# Patient Record
Sex: FEMALE | Race: WHITE | NOT HISPANIC OR LATINO | Employment: FULL TIME | URBAN - METROPOLITAN AREA
[De-identification: names, ages, dates, MRNs, and addresses within clinical notes are randomized per-mention and may not be internally consistent; named-entity substitution may affect disease eponyms.]

---

## 2016-07-22 LAB — HBA1C MFR BLD HPLC: 5.9 %

## 2017-09-12 LAB — HBA1C MFR BLD HPLC: 6.3 %

## 2018-01-18 LAB — HBA1C MFR BLD HPLC: 6.3 %

## 2018-08-07 LAB — HBA1C MFR BLD HPLC: 6.6 %

## 2018-08-08 ENCOUNTER — TELEPHONE (OUTPATIENT)
Dept: ENDOCRINOLOGY | Facility: HOSPITAL | Age: 63
End: 2018-08-08

## 2018-08-14 ENCOUNTER — OFFICE VISIT (OUTPATIENT)
Dept: ENDOCRINOLOGY | Facility: HOSPITAL | Age: 63
End: 2018-08-14
Payer: COMMERCIAL

## 2018-08-14 VITALS
DIASTOLIC BLOOD PRESSURE: 90 MMHG | HEART RATE: 82 BPM | HEIGHT: 60 IN | WEIGHT: 154.8 LBS | SYSTOLIC BLOOD PRESSURE: 144 MMHG | BODY MASS INDEX: 30.39 KG/M2

## 2018-08-14 DIAGNOSIS — E89.0 POSTOPERATIVE HYPOTHYROIDISM: Primary | ICD-10-CM

## 2018-08-14 DIAGNOSIS — I10 ESSENTIAL HYPERTENSION: ICD-10-CM

## 2018-08-14 DIAGNOSIS — E11.9 TYPE 2 DIABETES MELLITUS WITHOUT COMPLICATION, WITHOUT LONG-TERM CURRENT USE OF INSULIN (HCC): ICD-10-CM

## 2018-08-14 DIAGNOSIS — E78.2 MIXED HYPERLIPIDEMIA: ICD-10-CM

## 2018-08-14 DIAGNOSIS — E04.1 THYROID NODULE: ICD-10-CM

## 2018-08-14 DIAGNOSIS — Z86.39 HISTORY OF PRIMARY HYPERPARATHYROIDISM: ICD-10-CM

## 2018-08-14 PROBLEM — R73.03 PREDIABETES: Status: ACTIVE | Noted: 2018-08-14

## 2018-08-14 PROCEDURE — 99205 OFFICE O/P NEW HI 60 MIN: CPT | Performed by: INTERNAL MEDICINE

## 2018-08-14 RX ORDER — METFORMIN HYDROCHLORIDE 500 MG/1
TABLET, EXTENDED RELEASE ORAL
Refills: 5 | COMMUNITY
Start: 2018-07-13 | End: 2018-08-14 | Stop reason: SDUPTHER

## 2018-08-14 RX ORDER — METFORMIN HYDROCHLORIDE 500 MG/1
TABLET, EXTENDED RELEASE ORAL
Qty: 60 TABLET | Refills: 11 | Status: SHIPPED | OUTPATIENT
Start: 2018-08-14

## 2018-08-14 RX ORDER — SPIRONOLACTONE 50 MG/1
50 TABLET, FILM COATED ORAL DAILY
Refills: 3 | COMMUNITY
Start: 2018-07-13

## 2018-08-14 RX ORDER — ATORVASTATIN CALCIUM 20 MG/1
20 TABLET, FILM COATED ORAL DAILY
Refills: 4 | COMMUNITY
Start: 2018-07-13

## 2018-08-14 RX ORDER — FENOFIBRATE 145 MG/1
145 TABLET, COATED ORAL DAILY
Refills: 5 | COMMUNITY
Start: 2018-07-13

## 2018-08-14 RX ORDER — METOPROLOL TARTRATE 50 MG/1
50 TABLET, FILM COATED ORAL 2 TIMES DAILY
Refills: 3 | COMMUNITY
Start: 2018-07-13

## 2018-08-14 RX ORDER — LEVOTHYROXINE SODIUM 0.07 MG/1
75 TABLET ORAL DAILY
Refills: 5 | COMMUNITY
Start: 2018-07-13

## 2018-08-14 NOTE — LETTER
August 14, 2018     Viji Mandujano, [address]  [address] [address]    Patient: Michelle Champagne   YOB: 1955   Date of Visit: 8/14/2018       Dear Dr Екатерина Schaffer: Thank you for referring Michelle Champagne to me for evaluation  Below are my notes for this consultation  If you have questions, please do not hesitate to call me  I look forward to following your patient along with you  Sincerely,        Yusuf Aranda DO        CC: No Recipients  Yusuf Aranda DO  8/14/2018  8:09 AM  Sign at close encounter  8/14/2018    Assessment/Plan      Diagnoses and all orders for this visit:    Postoperative hypothyroidism  -     TSH, 3rd generation Lab Collect; Future  -     T4, free Lab Collect; Future  -     TSH, 3rd generation Lab Collect  -     T4, free Lab Collect    History of primary hyperparathyroidism  -     Comprehensive metabolic panel Lab Collect; Future  -     PTH, intact- Lab Collect; Future  -     Comprehensive metabolic panel Lab Collect  -     PTH, intact- Lab Collect    Thyroid nodule  -     US thyroid; Future    Type 2 diabetes mellitus without complication, without long-term current use of insulin (HCC)  -     HEMOGLOBIN A1C W/ EAG ESTIMATION Lab Collect; Future  -     Comprehensive metabolic panel Lab Collect; Future  -     Microalbumin / creatinine urine ratio- Lab Collect; Future  -     metFORMIN (GLUCOPHAGE-XR) 500 mg 24 hr tablet; 2 tab daily   -     HEMOGLOBIN A1C W/ EAG ESTIMATION Lab Collect  -     Comprehensive metabolic panel Lab Collect  -     Microalbumin / creatinine urine ratio- Lab Collect    Mixed hyperlipidemia  -     Lipid Panel with Direct LDL reflex Lab Collect; Future  -     Lipid Panel with Direct LDL reflex Lab Collect    Essential hypertension  -     Comprehensive metabolic panel Lab Collect; Future  -     Comprehensive metabolic panel Lab Collect    Other orders  -     spironolactone (ALDACTONE) 50 mg tablet;  Take 50 mg by mouth daily  -     metoprolol tartrate (LOPRESSOR) 50 mg tablet; Take 50 mg by mouth 2 (two) times a day  -     verapamil (CALAN-SR) 180 mg CR tablet; Take 180 mg by mouth daily  -     levothyroxine 75 mcg tablet; Take 75 mcg by mouth daily  -     Discontinue: metFORMIN (GLUCOPHAGE-XR) 500 mg 24 hr tablet; TAKE ONE TABLET BY MOUTH EVERY DAY WITH SUPPER  -     fenofibrate (TRICOR) 145 mg tablet; Take 145 mg by mouth daily  -     atorvastatin (LIPITOR) 20 mg tablet; Take 20 mg by mouth daily        Assessment/Plan:  1  DM2: New diagnosis based on a1c over 6 5 and fasting sugar 132  Increase metformin XR to 1000 mg daily  Will plan to repeat A1c and CMP in 6 months which will be before her next appointment  I will include a urine microalbumin to creatinine ratios well  She saw her eye doctor in December of 2017  I encouraged her to go again in December of 2018 for a dilated eye exam and evaluation of her retinas  2   Postsurgical hypothyroidism:  Clinically and biochemically euthyroid on levothyroxine 75 mcg daily  3   Thyroid nodule: She will be due for an ultrasound of thyroid nodule in the remaining part of the thyroid prior to her next appointment  4   Hypertension:  Slightly elevated today though the patient states she is slightly nervous  Will continue current regimen and monitor over time  And    5  Hyperlipidemia:  Continue current regimen of fenofibrate and atorvastatin  6   History of primary hyperparathyroidism status post parathyroid adenoma removal:  Her calcium is read as elevated in recent labs at 10 6 though her albumin is 4 7  Her calcium probably corrects to the high end of the normal range  Her PTH level is 24 which is reassuring  We will plan to follow with this with a CMP and PTH before next appointment        CC: DM, hypothyroidism    History of Present Illness     HPI: Malgorzata West is a 66y o  year old female with history of hypothyroidism status post subtotal thyroidectomy for thyroid nodules  Pathology was listed as no evidence of hyperplasia atypia or malignancy in the thyroid nodules  At the same time she also had a hypercellular parathyroid removed for primary hyperparathyroidism  She has been maintained on levothyroxine 75 mcg daily  Overall she feels okay  She has a thyroid nodule in the remaining part of her thyroid lobe which is followed with serial ultrasounds  In the past she was followed for prediabetes  Was recent blood work suggest type 2 diabetes  She denies polyuria, polydipsia  She denies any painful neuropathy use  She is on metformin  mg daily  She does report a family history in her sister of diabetes  She also has a history of hypertension in takes verapamil, spironolactone, metoprolol  For her hypertriglyceridemia and hyperlipidemia she takes atorvastatin 20 mg daily and fenofibrate 145 mg daily  Review of Systems   Constitutional: Negative for fatigue  HENT: Negative for trouble swallowing and voice change  Eyes: Negative for visual disturbance  Respiratory: Negative for shortness of breath  Cardiovascular: Negative for palpitations and leg swelling  Gastrointestinal: Negative for abdominal pain, nausea and vomiting  Endocrine: Negative for polydipsia and polyuria  Musculoskeletal: Negative for arthralgias and myalgias  Skin: Negative for rash  Neurological: Negative for dizziness, tremors and weakness  Hematological: Negative for adenopathy  Psychiatric/Behavioral: Negative for agitation and confusion  Historical Information   No past medical history on file  No past surgical history on file    Social History   History   Alcohol use Not on file     History   Drug use: Unknown     History   Smoking Status    Never Smoker   Smokeless Tobacco    Never Used     Family History:   Family History   Problem Relation Age of Onset    No Known Problems Mother     Hypertension Father     Diabetes type II Sister Meds/Allergies   Current Outpatient Prescriptions   Medication Sig Dispense Refill    atorvastatin (LIPITOR) 20 mg tablet Take 20 mg by mouth daily  4    fenofibrate (TRICOR) 145 mg tablet Take 145 mg by mouth daily  5    levothyroxine 75 mcg tablet Take 75 mcg by mouth daily  5    metFORMIN (GLUCOPHAGE-XR) 500 mg 24 hr tablet 2 tab daily  60 tablet 11    metoprolol tartrate (LOPRESSOR) 50 mg tablet Take 50 mg by mouth 2 (two) times a day  3    spironolactone (ALDACTONE) 50 mg tablet Take 50 mg by mouth daily  3    verapamil (CALAN-SR) 180 mg CR tablet Take 180 mg by mouth daily  3     No current facility-administered medications for this visit  Allergies   Allergen Reactions    Atenolol Swelling    Hydrochlorothiazide Other (See Comments)     Pt thinks she got Gout   Lisinopril Other (See Comments)     Pt does not remember the reaction  Objective   Vitals: Blood pressure 144/90, pulse 82, height 5' (1 524 m), weight 70 2 kg (154 lb 12 8 oz)  Invasive Devices          No matching active lines, drains, or airways          Physical Exam   Constitutional: She is oriented to person, place, and time  She appears well-developed and well-nourished  No distress  HENT:   Head: Normocephalic and atraumatic  Eyes: Conjunctivae are normal  Pupils are equal, round, and reactive to light  Neck: Normal range of motion  Neck supple  No thyromegaly present  Cardiovascular: Normal rate and regular rhythm  No murmur heard  Pulmonary/Chest: Effort normal and breath sounds normal  No respiratory distress  Abdominal: Soft  Bowel sounds are normal  She exhibits no distension  Musculoskeletal: Normal range of motion  She exhibits no edema  Neurological: She is alert and oriented to person, place, and time  She exhibits normal muscle tone  Skin: Skin is warm and dry  No rash noted  She is not diaphoretic  Psychiatric: She has a normal mood and affect   Her behavior is normal    Vitals reviewed  The history was obtained from the review of the chart and from the patient  Lab Results:      Labs from [facility] on 08/07/2018: White blood cells 10, hemoglobin 14 3, hematocrit 43 4, platelets 265, glucose 132, BUN 23, creatinine 0 95, GFR 65, calcium 10 6, albumin 4 7, liver function within normal limits, total cholesterol 169, triglycerides 184, HDL 47, LDL 85, TSH 1 17, free thyroxine index 2 2, PTH 24, A1c 6 6, B12 584  No future appointments

## 2018-08-14 NOTE — PROGRESS NOTES
8/14/2018    Assessment/Plan      Diagnoses and all orders for this visit:    Postoperative hypothyroidism  -     TSH, 3rd generation Lab Collect; Future  -     T4, free Lab Collect; Future  -     TSH, 3rd generation Lab Collect  -     T4, free Lab Collect    History of primary hyperparathyroidism  -     Comprehensive metabolic panel Lab Collect; Future  -     PTH, intact- Lab Collect; Future  -     Comprehensive metabolic panel Lab Collect  -     PTH, intact- Lab Collect    Thyroid nodule  -     US thyroid; Future    Type 2 diabetes mellitus without complication, without long-term current use of insulin (HCC)  -     HEMOGLOBIN A1C W/ EAG ESTIMATION Lab Collect; Future  -     Comprehensive metabolic panel Lab Collect; Future  -     Microalbumin / creatinine urine ratio- Lab Collect; Future  -     metFORMIN (GLUCOPHAGE-XR) 500 mg 24 hr tablet; 2 tab daily   -     HEMOGLOBIN A1C W/ EAG ESTIMATION Lab Collect  -     Comprehensive metabolic panel Lab Collect  -     Microalbumin / creatinine urine ratio- Lab Collect    Mixed hyperlipidemia  -     Lipid Panel with Direct LDL reflex Lab Collect; Future  -     Lipid Panel with Direct LDL reflex Lab Collect    Essential hypertension  -     Comprehensive metabolic panel Lab Collect; Future  -     Comprehensive metabolic panel Lab Collect    Other orders  -     spironolactone (ALDACTONE) 50 mg tablet; Take 50 mg by mouth daily  -     metoprolol tartrate (LOPRESSOR) 50 mg tablet; Take 50 mg by mouth 2 (two) times a day  -     verapamil (CALAN-SR) 180 mg CR tablet; Take 180 mg by mouth daily  -     levothyroxine 75 mcg tablet; Take 75 mcg by mouth daily  -     Discontinue: metFORMIN (GLUCOPHAGE-XR) 500 mg 24 hr tablet; TAKE ONE TABLET BY MOUTH EVERY DAY WITH SUPPER  -     fenofibrate (TRICOR) 145 mg tablet; Take 145 mg by mouth daily  -     atorvastatin (LIPITOR) 20 mg tablet; Take 20 mg by mouth daily        Assessment/Plan:  1   DM2: New diagnosis based on a1c over 6 5 and fasting sugar 132  Increase metformin XR to 1000 mg daily  Will plan to repeat A1c and CMP in 6 months which will be before her next appointment  I will include a urine microalbumin to creatinine ratios well  She saw her eye doctor in December of 2017  I encouraged her to go again in December of 2018 for a dilated eye exam and evaluation of her retinas  2   Postsurgical hypothyroidism:  Clinically and biochemically euthyroid on levothyroxine 75 mcg daily  3   Thyroid nodule: She will be due for an ultrasound of thyroid nodule in the remaining part of the thyroid prior to her next appointment  4   Hypertension:  Slightly elevated today though the patient states she is slightly nervous  Will continue current regimen and monitor over time  And    5  Hyperlipidemia:  Continue current regimen of fenofibrate and atorvastatin  6   History of primary hyperparathyroidism status post parathyroid adenoma removal:  Her calcium is read as elevated in recent labs at 10 6 though her albumin is 4 7  Her calcium probably corrects to the high end of the normal range  Her PTH level is 24 which is reassuring  We will plan to follow with this with a CMP and PTH before next appointment  CC: DM, hypothyroidism    History of Present Illness     HPI: Alejandrina Espino is a 66y o  year old female with history of hypothyroidism status post subtotal thyroidectomy for thyroid nodules  Pathology was listed as no evidence of hyperplasia atypia or malignancy in the thyroid nodules  At the same time she also had a hypercellular parathyroid removed for primary hyperparathyroidism  She has been maintained on levothyroxine 75 mcg daily  Overall she feels okay  She has a thyroid nodule in the remaining part of her thyroid lobe which is followed with serial ultrasounds  In the past she was followed for prediabetes  Was recent blood work suggest type 2 diabetes  She denies polyuria, polydipsia    She denies any painful neuropathy use  She is on metformin  mg daily  She does report a family history in her sister of diabetes  She also has a history of hypertension in takes verapamil, spironolactone, metoprolol  For her hypertriglyceridemia and hyperlipidemia she takes atorvastatin 20 mg daily and fenofibrate 145 mg daily  Review of Systems   Constitutional: Negative for fatigue  HENT: Negative for trouble swallowing and voice change  Eyes: Negative for visual disturbance  Respiratory: Negative for shortness of breath  Cardiovascular: Negative for palpitations and leg swelling  Gastrointestinal: Negative for abdominal pain, nausea and vomiting  Endocrine: Negative for polydipsia and polyuria  Musculoskeletal: Negative for arthralgias and myalgias  Skin: Negative for rash  Neurological: Negative for dizziness, tremors and weakness  Hematological: Negative for adenopathy  Psychiatric/Behavioral: Negative for agitation and confusion  Historical Information   No past medical history on file  No past surgical history on file  Social History   History   Alcohol use Not on file     History   Drug use: Unknown     History   Smoking Status    Never Smoker   Smokeless Tobacco    Never Used     Family History:   Family History   Problem Relation Age of Onset    No Known Problems Mother     Hypertension Father     Diabetes type II Sister        Meds/Allergies   Current Outpatient Prescriptions   Medication Sig Dispense Refill    atorvastatin (LIPITOR) 20 mg tablet Take 20 mg by mouth daily  4    fenofibrate (TRICOR) 145 mg tablet Take 145 mg by mouth daily  5    levothyroxine 75 mcg tablet Take 75 mcg by mouth daily  5    metFORMIN (GLUCOPHAGE-XR) 500 mg 24 hr tablet 2 tab daily   60 tablet 11    metoprolol tartrate (LOPRESSOR) 50 mg tablet Take 50 mg by mouth 2 (two) times a day  3    spironolactone (ALDACTONE) 50 mg tablet Take 50 mg by mouth daily  3    verapamil (CALAN-SR) 180 mg CR tablet Take 180 mg by mouth daily  3     No current facility-administered medications for this visit  Allergies   Allergen Reactions    Atenolol Swelling    Hydrochlorothiazide Other (See Comments)     Pt thinks she got Gout   Lisinopril Other (See Comments)     Pt does not remember the reaction  Objective   Vitals: Blood pressure 144/90, pulse 82, height 5' (1 524 m), weight 70 2 kg (154 lb 12 8 oz)  Invasive Devices          No matching active lines, drains, or airways          Physical Exam   Constitutional: She is oriented to person, place, and time  She appears well-developed and well-nourished  No distress  HENT:   Head: Normocephalic and atraumatic  Eyes: Conjunctivae are normal  Pupils are equal, round, and reactive to light  Neck: Normal range of motion  Neck supple  No thyromegaly present  Cardiovascular: Normal rate and regular rhythm  No murmur heard  Pulmonary/Chest: Effort normal and breath sounds normal  No respiratory distress  Abdominal: Soft  Bowel sounds are normal  She exhibits no distension  Musculoskeletal: Normal range of motion  She exhibits no edema  Neurological: She is alert and oriented to person, place, and time  She exhibits normal muscle tone  Skin: Skin is warm and dry  No rash noted  She is not diaphoretic  Psychiatric: She has a normal mood and affect  Her behavior is normal    Vitals reviewed  The history was obtained from the review of the chart and from the patient  Lab Results:      Labs from [facility] on 08/07/2018: White blood cells 10, hemoglobin 14 3, hematocrit 43 4, platelets 088, glucose 132, BUN 23, creatinine 0 95, GFR 65, calcium 10 6, albumin 4 7, liver function within normal limits, total cholesterol 169, triglycerides 184, HDL 47, LDL 85, TSH 1 17, free thyroxine index 2 2, PTH 24, A1c 6 6, B12 584  No future appointments

## 2020-10-19 ENCOUNTER — TELEPHONE (OUTPATIENT)
Dept: GASTROENTEROLOGY | Facility: CLINIC | Age: 65
End: 2020-10-19

## 2020-11-27 ENCOUNTER — TELEMEDICINE (OUTPATIENT)
Dept: GASTROENTEROLOGY | Facility: CLINIC | Age: 65
End: 2020-11-27

## 2020-11-27 VITALS — BODY MASS INDEX: 26.5 KG/M2 | HEIGHT: 60 IN | WEIGHT: 135 LBS

## 2020-11-27 DIAGNOSIS — Z12.11 SCREENING FOR COLON CANCER: Primary | ICD-10-CM

## 2020-11-27 RX ORDER — SODIUM, POTASSIUM,MAG SULFATES 17.5-3.13G
SOLUTION, RECONSTITUTED, ORAL ORAL
Qty: 2 BOTTLE | Refills: 0 | Status: SHIPPED | OUTPATIENT
Start: 2020-11-27

## 2020-12-04 ENCOUNTER — HOSPITAL ENCOUNTER (OUTPATIENT)
Dept: GASTROENTEROLOGY | Facility: AMBULATORY SURGERY CENTER | Age: 65
Discharge: HOME | End: 2020-12-04
Payer: COMMERCIAL

## 2020-12-04 ENCOUNTER — ANESTHESIA (OUTPATIENT)
Dept: GASTROENTEROLOGY | Facility: AMBULATORY SURGERY CENTER | Age: 65
End: 2020-12-04

## 2020-12-04 ENCOUNTER — ANESTHESIA EVENT (OUTPATIENT)
Dept: GASTROENTEROLOGY | Facility: AMBULATORY SURGERY CENTER | Age: 65
End: 2020-12-04

## 2020-12-04 VITALS
TEMPERATURE: 99.4 F | RESPIRATION RATE: 26 BRPM | DIASTOLIC BLOOD PRESSURE: 77 MMHG | SYSTOLIC BLOOD PRESSURE: 126 MMHG | OXYGEN SATURATION: 98 % | HEART RATE: 73 BPM

## 2020-12-04 VITALS — HEART RATE: 72 BPM

## 2020-12-04 DIAGNOSIS — Z86.010 HISTORY OF COLON POLYPS: ICD-10-CM

## 2020-12-04 PROCEDURE — 45380 COLONOSCOPY AND BIOPSY: CPT | Performed by: INTERNAL MEDICINE

## 2020-12-04 RX ORDER — SODIUM CHLORIDE 9 MG/ML
50 INJECTION, SOLUTION INTRAVENOUS CONTINUOUS
Status: DISCONTINUED | OUTPATIENT
Start: 2020-12-04 | End: 2020-12-08 | Stop reason: HOSPADM

## 2020-12-04 RX ORDER — PROPOFOL 10 MG/ML
INJECTION, EMULSION INTRAVENOUS AS NEEDED
Status: DISCONTINUED | OUTPATIENT
Start: 2020-12-04 | End: 2020-12-04

## 2020-12-04 RX ADMIN — SODIUM CHLORIDE 50 ML/HR: 9 INJECTION, SOLUTION INTRAVENOUS at 08:56

## 2020-12-04 RX ADMIN — PROPOFOL 80 MG: 10 INJECTION, EMULSION INTRAVENOUS at 09:18

## 2020-12-04 RX ADMIN — PROPOFOL 40 MG: 10 INJECTION, EMULSION INTRAVENOUS at 09:27

## 2020-12-04 RX ADMIN — PROPOFOL 40 MG: 10 INJECTION, EMULSION INTRAVENOUS at 09:22

## 2020-12-04 RX ADMIN — PROPOFOL 40 MG: 10 INJECTION, EMULSION INTRAVENOUS at 09:25

## 2023-02-16 LAB
CREAT ?TM UR-SCNC: 114.5 UMOL/L
EXT ALBUMIN URINE RANDOM: 55.3
HBA1C MFR BLD HPLC: 8 %
MICROALBUMIN/CREAT UR: 48 MG/G{CREAT}

## 2023-08-22 LAB — HBA1C MFR BLD HPLC: 7.6 %

## 2023-12-01 ENCOUNTER — OFFICE VISIT (OUTPATIENT)
Dept: ENDOCRINOLOGY | Facility: HOSPITAL | Age: 68
End: 2023-12-01
Payer: MEDICARE

## 2023-12-01 VITALS
WEIGHT: 135 LBS | HEIGHT: 60 IN | HEART RATE: 82 BPM | OXYGEN SATURATION: 97 % | SYSTOLIC BLOOD PRESSURE: 130 MMHG | BODY MASS INDEX: 26.5 KG/M2 | DIASTOLIC BLOOD PRESSURE: 78 MMHG

## 2023-12-01 DIAGNOSIS — I10 ESSENTIAL HYPERTENSION: ICD-10-CM

## 2023-12-01 DIAGNOSIS — E78.2 MIXED HYPERLIPIDEMIA: ICD-10-CM

## 2023-12-01 DIAGNOSIS — E89.0 POSTOPERATIVE HYPOTHYROIDISM: ICD-10-CM

## 2023-12-01 DIAGNOSIS — Z86.39 HISTORY OF PRIMARY HYPERPARATHYROIDISM: ICD-10-CM

## 2023-12-01 DIAGNOSIS — E11.9 TYPE 2 DIABETES MELLITUS WITHOUT COMPLICATION, WITHOUT LONG-TERM CURRENT USE OF INSULIN (HCC): Primary | ICD-10-CM

## 2023-12-01 PROBLEM — R73.03 PREDIABETES: Status: RESOLVED | Noted: 2018-08-14 | Resolved: 2023-12-01

## 2023-12-01 PROCEDURE — 99204 OFFICE O/P NEW MOD 45 MIN: CPT | Performed by: INTERNAL MEDICINE

## 2023-12-01 RX ORDER — ALENDRONATE SODIUM 70 MG/1
70 TABLET ORAL
COMMUNITY
Start: 2023-11-28

## 2023-12-01 RX ORDER — METHOTREXATE 2.5 MG/1
TABLET ORAL
COMMUNITY
Start: 2023-11-15

## 2023-12-01 RX ORDER — FOLIC ACID 1 MG/1
1000 TABLET ORAL DAILY
COMMUNITY
Start: 2023-11-13

## 2023-12-01 RX ORDER — GLIPIZIDE 10 MG/1
10 TABLET, FILM COATED, EXTENDED RELEASE ORAL DAILY
COMMUNITY
Start: 2023-09-23

## 2023-12-01 RX ORDER — PREDNISONE 5 MG/1
2.5 TABLET ORAL DAILY
COMMUNITY
Start: 2023-11-24

## 2023-12-01 NOTE — ASSESSMENT & PLAN NOTE
The last TSH is normal signifying biochemical euthyroidism. She will continue the same levothyroxine 75 mcg daily.

## 2023-12-01 NOTE — PATIENT INSTRUCTIONS
The last hgba1c is 7.6%. this is a bit high and was before you started the prednisone. The prednisone will raise blood sugars. I do not want to do blood work now as the prednisone will increase blood sugars. Since you are going to go off prednisone in 2 weeks or so. Let's have you change the blood sugars  testing to alternate am and before supper starting about 2 weeks after off prednisone and call them to me. For now, no change in jardiance, glipizide, and metformin. Continue tow work on diabetic diet. The thyroid is ok, continue the same levothyroxine. Work on getting a eye doctor visit when able. Follow up in 4 months with blood work.

## 2023-12-01 NOTE — PROGRESS NOTES
12/1/2023    Assessment/Plan     1. Type 2 diabetes mellitus without complication, without long-term current use of insulin ([facility])  Assessment & Plan:  Most recent hemoglobin A1c is not bad at 7.6 percent and has improved over the last year and a half. Unfortunately, she has now been on prednisone since then. I expect that her blood glucose levels are higher as she has been on prednisone. For now, she will be going off prednisone in the next couple of weeks, so I will not adjust her Jardiance, glipizide, and metformin. Once she goes off prednisone, I have asked her to give it 2 weeks and make sure to start testing her blood sugars in the morning and then the next day before supper and then again in the morning the next day and just alternate the times and send those to the office after several weeks. I have asked her to follow up with ophthalmology for diabetic eye exam as she is overdue for that and spot check in the office is not adequate for a diabetic to evaluate for retinopathy in the periphery of the retina. She is following with podiatry. Orders:  -     Comprehensive metabolic panel Lab Collect; Future; Expected date: 02/26/2024  -     CBC and differential Lab Collect; Future; Expected date: 02/26/2024  -     T4, free Lab Collect; Future; Expected date: 02/26/2024  -     TSH, 3rd generation with Free T4 reflex; Future; Expected date: 02/26/2024  -     HEMOGLOBIN A1C W/ EAG ESTIMATION Lab Collect; Future; Expected date: 02/26/2024  -     Phosphorus Lab Collect; Future; Expected date: 02/26/2024    2. Postoperative hypothyroidism  Assessment & Plan:  The last TSH is normal signifying biochemical euthyroidism. She will continue the same levothyroxine 75 mcg daily. Orders:  -     Comprehensive metabolic panel Lab Collect; Future; Expected date: 02/26/2024  -     CBC and differential Lab Collect; Future; Expected date: 02/26/2024  -     T4, free Lab Collect;  Future; Expected date: 02/26/2024  -     TSH, 3rd generation with Free T4 reflex; Future; Expected date: 02/26/2024  -     HEMOGLOBIN A1C W/ EAG ESTIMATION Lab Collect; Future; Expected date: 02/26/2024  -     Phosphorus Lab Collect; Future; Expected date: 02/26/2024    3. Essential hypertension  Assessment & Plan:  She is normotensive in the office on her current dose of metoprolol, verapamil, and spironolactone. Orders:  -     Comprehensive metabolic panel Lab Collect; Future; Expected date: 02/26/2024  -     CBC and differential Lab Collect; Future; Expected date: 02/26/2024  -     T4, free Lab Collect; Future; Expected date: 02/26/2024  -     TSH, 3rd generation with Free T4 reflex; Future; Expected date: 02/26/2024  -     HEMOGLOBIN A1C W/ EAG ESTIMATION Lab Collect; Future; Expected date: 02/26/2024  -     Phosphorus Lab Collect; Future; Expected date: 02/26/2024    4. Mixed hyperlipidemia  Assessment & Plan:  The last lipid profile was excellent on her current dose of atorvastatin and fenofibrate. Orders:  -     Comprehensive metabolic panel Lab Collect; Future; Expected date: 02/26/2024  -     CBC and differential Lab Collect; Future; Expected date: 02/26/2024  -     T4, free Lab Collect; Future; Expected date: 02/26/2024  -     TSH, 3rd generation with Free T4 reflex; Future; Expected date: 02/26/2024  -     HEMOGLOBIN A1C W/ EAG ESTIMATION Lab Collect; Future; Expected date: 02/26/2024  -     Phosphorus Lab Collect; Future; Expected date: 02/26/2024    5. History of primary hyperparathyroidism  Assessment & Plan:  She had left parathyroid adenoma removed and most recent calcium levels have fluctuated up and down, but the parathyroid hormone level has also decreased when calcium is higher, which is appropriate response. For now, this will be followed over time. Orders:  -     Comprehensive metabolic panel Lab Collect; Future; Expected date: 02/26/2024  -     CBC and differential Lab Collect;  Future; Expected date: 02/26/2024  -     T4, free Lab Collect; Future; Expected date: 02/26/2024  -     TSH, 3rd generation with Free T4 reflex; Future; Expected date: 02/26/2024  -     HEMOGLOBIN A1C W/ EAG ESTIMATION Lab Collect; Future; Expected date: 02/26/2024  -     Phosphorus Lab Collect; Future; Expected date: 02/26/2024       I have asked her to follow up in 3 to 4 months with preceding hemoglobin A1c, CMP, CBC, TSH, and free T4.    CC: Diabetes Consult    History of Present Illness     HPI: Porter Solo is a 79y.o. year old female who is a new patient consult for diabetes and thyroid. Her rheumatologist recommended her to see endocrinology for help with diabetes and thyroid. She was diagnosed with primary hyperparathyroidism in 2011. She had 1 thyroid nodule which was biopsied and did not have enough material for evaluation. She had subtotal thyroidectomy and left lower parathyroidectomy in 2011 by Dr. Torsten Jorge. Based on the pathological findings in 04/20211, cancer was not detected. For her hypothyroidism, she is on levothyroxine 75 mcg 1 pill a day. She takes it first thing in the morning on an empty stomach and waits at least 0.5 to an hour before eating. She denies temperature intolerance, heart racing, palpitations, tremors, or shaky hands, diarrhea, constipation, abdominal pain, or feeling sick of the stomach, hair loss, fatigue during the day, dysphagia and any radiation treatment to her head or neck. She has dry skin. Her nails break a lot. She sleeps well at night. She wakes up 2 times to go to the bathroom. Her weight has been fluctuating. She was diagnosed by Dr. Rodo Ruano with diabetes in 2018. She was already taking a standard metformin 1000 mg once a day in the evening. She was also on glipizide once a day for 1.5 years. She was also currently taking Jardiance for approximately 2 years. Her metformin was changed last year from 500 to 1000 mg daily. She has seen a dietitian online.  She experiences frequent urination and has mentioned waking up twice at night to urinate. She admits to polydipsia but not polyphagia. She denies numbness or tingling and wound of her feet. She sees a podiatrist for plantar fasciitis. Her podiatrist observed that some of her toenails are not in optimal condition. She had her most recent appointment with the podiatrist for her toenails about 6 to 7 weeks ago. She typically visits her podiatrist every 6 weeks but has extended the intervals due to insurance coverage limitations. She denies blurry vision other than when she is not wearing her eyeglasses and any diabetic changes in her eye. Her last eye doctor visit was 2 years ago. She attempted to contact her eye doctor, but there has been no response, and she is uncertain about the status of her doctor, whether retired or still practicing. She denies kidney problems from diabetes, neuropathy or nerve problems, heart attacks or strokes and circulation issues in her legs. She checks her blood glucose once a day in the morning. Her blood glucose today was 130. Earlier this week, her blood glucose was 115 to 116. She had some low blood glucose levels in the summer around 09/2023 or early fall as low as 70 to 71. She felt her head was cloudy. She drank juice and ate some crackers. She denies chest pain or dyspnea, headaches, lightheadedness, dizziness, or weakness on one side or the other or stroke like symptoms. She is on metoprolol 50 mg twice a day, spironolactone 50 mg once a day, and verapamil 180 mg a day. She has rheumatoid arthritis. She started prednisone 09/20/2023 and is currently on 2.5 mg. Her rheumatologist wanted to wean her off prednisone in 2 weeks and reduced the dosage earlier this week. She is taking alendronate once a week. She is on folic acid every day together with methotrexate.  She used to take calcium, but her rheumatologist advised her to discontinue it, possibly for a period of 5 to 6 weeks and started her medication for her bone. She is on cholesterol medicines which are fenofibrate 145 mg a day and atorvastatin 40 mg a day. Last A1C was   Lab Results   Component Value Date    HGBA1C 6.6 08/07/2018   . Review of Systems  The pertinent positive and negative findings are as noted in the HPI.     Historical Information   Past Medical History:   Diagnosis Date    Diabetes mellitus ([address])     Disease of thyroid gland     hypo    Hyperlipidemia     Hyperparathyroidism ([address])     Hypertension      Past Surgical History:   Procedure Laterality Date    PARATHYROID GLAND SURGERY  2011    left lower parathyroid removed    THYROIDECTOMY  2011    subtotal thyroidectomy     Social History   Social History     Substance and Sexual Activity   Alcohol Use Never     Social History     Substance and Sexual Activity   Drug Use Never     Social History     Tobacco Use   Smoking Status Never   Smokeless Tobacco Never     Family History:   Family History   Problem Relation Age of Onset    Thyroid disease unspecified Mother     Hypertension Father     Diabetes type II Sister     Colon polyps Neg Hx     Colon cancer Neg Hx        Meds/Allergies   Current Outpatient Medications   Medication Sig Dispense Refill    alendronate (FOSAMAX) 70 mg tablet Take 70 mg by mouth every 7 days      atorvastatin (LIPITOR) 40 mg tablet Take 40 mg by mouth daily  4    Empagliflozin 25 MG TABS Take by mouth 25 mg daily      fenofibrate (TRICOR) 145 mg tablet Take 145 mg by mouth daily  5    folic acid (FOLVITE) 1 mg tablet Take 1,000 mcg by mouth daily      glipiZIDE (GLUCOTROL XL) 10 mg 24 hr tablet Take 10 mg by mouth daily      levothyroxine 75 mcg tablet Take 75 mcg by mouth daily  5    METFORMIN HCL PO Take by mouth 1000mg daily      methotrexate 2.5 MG tablet TAKE 6 TABLETS BY MOUTH ONCE PER WEEK      metoprolol tartrate (LOPRESSOR) 50 mg tablet Take 50 mg by mouth 2 (two) times a day  3    predniSONE 5 mg tablet Take 2.5 mg by mouth daily spironolactone (ALDACTONE) 50 mg tablet Take 50 mg by mouth daily  3    verapamil (CALAN-SR) 180 mg CR tablet Take 180 mg by mouth daily  3    Na Sulfate-K Sulfate-Mg Sulf (Suprep Bowel Prep Kit) 17.5-3.13-1.6 GM/177ML SOLN Use as directed (Patient not taking: Reported on 12/1/2023) 2 Bottle 0     No current facility-administered medications for this visit. Allergies   Allergen Reactions    Atenolol Swelling    Hydrochlorothiazide Other (See Comments)     Pt thinks she got Gout. Lisinopril Other (See Comments)     Pt does not remember the reaction. Objective   Vitals: Blood pressure 130/78, pulse 82, height 5' (1.524 m), weight 61.2 kg (135 lb), SpO2 97 %. Invasive Devices       None                   Physical Exam  Cardiovascular:      Pulses: no weak pulses          Dorsalis pedis pulses are 2+ on the right side and 2+ on the left side. Posterior tibial pulses are 2+ on the right side and 2+ on the left side. Feet:      Right foot:      Skin integrity: No ulcer, skin breakdown, erythema, warmth, callus or dry skin. Left foot:      Skin integrity: No ulcer, skin breakdown, erythema, warmth, callus or dry skin. Physical exam normal with pertinent positives and negatives. Eyes: No lid lag, stare, proptosis, or periorbital edema. Negative Chvostek sign. Neck exam: Demonstrates a healed anterior neck scar. No palpable thyroid tissue. No lymphadenopathy. No masses of the neck. Respiratory: Lungs clear. Cardiovascular: Heart regular. No murmurs. Back examination: No spinous process tenderness or CVA tenderness. Neurological: No tremor of the outstretched hands. Patellar deep tendon reflexes normal.   Foot exam: No lower extremity edema. No ulcerations in the feet. Bunions of the first metatarsophalangeal joint bilaterally. Dorsalis pedis and posterior tibialis pulses 2+. Dry skin of the feet. Vibration sensation is slightly diminished to the first toe DIP joints bilaterally. Microfilament sensation intact to both feet. Patient's shoes and socks removed. Right Foot/Ankle   Right Foot Inspection  Skin Exam: skin normal and skin intact. No dry skin, no warmth, no callus, no erythema, no maceration, no abnormal color, no pre-ulcer, no ulcer and no callus. Toe Exam: right toe deformity. No swelling    Sensory   Vibration: diminished  Monofilament testing: intact    Vascular  Capillary refills: < 3 seconds  The right DP pulse is 2+. The right PT pulse is 2+. Left Foot/Ankle  Left Foot Inspection  Skin Exam: skin normal and skin intact. No dry skin, no warmth, no erythema, no maceration, normal color, no pre-ulcer, no ulcer and no callus. Toe Exam: left toe deformity. No swelling. Sensory   Vibration: diminished  Monofilament testing: intact    Vascular  Capillary refills: < 3 seconds  The left DP pulse is 2+. The left PT pulse is 2+. Assign Risk Category  Deformity present  Loss of protective sensation  No weak pulses  Risk: 2        The history was obtained from the review of the chart and from the patient. Lab Results:    Most recent Alc is  Lab Results   Component Value Date    HGBA1C 6.6 08/07/2018             Blood work performed at [facility] on 08/22/2023 showed a hemoglobin A1c of 7.6 percent. Calcium was 10.5 with a parathyroid hormone level of 16 and cholesterol was quite good. Previous hemoglobin A1c in 04/2022 was 8.5 percent, which has improved significantly. More recent calcium was 10 with a parathyroid hormone level of 23. TSH in 08/2023 was 1.65. Future Appointments   Date Time Provider [address]   3/19/2024  1:20 PM Rico Rousseau MD [facility]       Transcribed for Rico Rousseau MD, by Eryn Tao on 12/01/23 at 3:27 PM. Powered by [employer].

## 2023-12-01 NOTE — ASSESSMENT & PLAN NOTE
Most recent hemoglobin A1c is not bad at 7.6 percent and has improved over the last year and a half. Unfortunately, she has now been on prednisone since then. I expect that her blood glucose levels are higher as she has been on prednisone. For now, she will be going off prednisone in the next couple of weeks, so I will not adjust her Jardiance, glipizide, and metformin. Once she goes off prednisone, I have asked her to give it 2 weeks and make sure to start testing her blood sugars in the morning and then the next day before supper and then again in the morning the next day and just alternate the times and send those to the office after several weeks. I have asked her to follow up with ophthalmology for diabetic eye exam as she is overdue for that and spot check in the office is not adequate for a diabetic to evaluate for retinopathy in the periphery of the retina. She is following with podiatry.

## 2023-12-21 LAB
LEFT EYE DIABETIC RETINOPATHY: NORMAL
RIGHT EYE DIABETIC RETINOPATHY: NORMAL
SEVERITY (EYE EXAM): NORMAL

## 2024-02-21 LAB — HBA1C MFR BLD HPLC: 9.4 %

## 2024-03-15 LAB
ALBUMIN SERPL-MCNC: 4.6 G/DL (ref 3.9–4.9)
ALBUMIN/GLOB SERPL: 2.1 {RATIO} (ref 1.2–2.2)
ALP SERPL-CCNC: 59 IU/L (ref 44–121)
ALT SERPL-CCNC: 20 IU/L (ref 0–32)
AST SERPL-CCNC: 19 IU/L (ref 0–40)
BASOPHILS # BLD AUTO: 0.1 X10E3/UL (ref 0–0.2)
BASOPHILS NFR BLD AUTO: 1 %
BILIRUB SERPL-MCNC: 0.5 MG/DL (ref 0–1.2)
BUN SERPL-MCNC: 25 MG/DL (ref 8–27)
BUN/CREAT SERPL: 29 (ref 12–28)
CALCIUM SERPL-MCNC: 10.3 MG/DL (ref 8.7–10.3)
CHLORIDE SERPL-SCNC: 100 MMOL/L (ref 96–106)
CO2 SERPL-SCNC: 22 MMOL/L (ref 20–29)
CREAT SERPL-MCNC: 0.86 MG/DL (ref 0.57–1)
EGFR: 74 ML/MIN/1.73
EOSINOPHIL # BLD AUTO: 0.4 X10E3/UL (ref 0–0.4)
EOSINOPHIL NFR BLD AUTO: 4 %
ERYTHROCYTE [DISTWIDTH] IN BLOOD BY AUTOMATED COUNT: 15.3 % (ref 11.7–15.4)
EST. AVERAGE GLUCOSE BLD GHB EST-MCNC: 217 MG/DL
GLOBULIN SER-MCNC: 2.2 G/DL (ref 1.5–4.5)
GLUCOSE SERPL-MCNC: 173 MG/DL (ref 70–99)
HBA1C MFR BLD: 9.2 % (ref 4.8–5.6)
HCT VFR BLD AUTO: 44.5 % (ref 34–46.6)
HGB BLD-MCNC: 14.3 G/DL (ref 11.1–15.9)
IMM GRANULOCYTES # BLD: 0 X10E3/UL (ref 0–0.1)
IMM GRANULOCYTES NFR BLD: 0 %
LYMPHOCYTES # BLD AUTO: 2 X10E3/UL (ref 0.7–3.1)
LYMPHOCYTES NFR BLD AUTO: 22 %
MCH RBC QN AUTO: 27.7 PG (ref 26.6–33)
MCHC RBC AUTO-ENTMCNC: 32.1 G/DL (ref 31.5–35.7)
MCV RBC AUTO: 86 FL (ref 79–97)
MONOCYTES # BLD AUTO: 0.3 X10E3/UL (ref 0.1–0.9)
MONOCYTES NFR BLD AUTO: 3 %
NEUTROPHILS # BLD AUTO: 6.5 X10E3/UL (ref 1.4–7)
NEUTROPHILS NFR BLD AUTO: 70 %
PHOSPHATE SERPL-MCNC: 3.7 MG/DL (ref 3–4.3)
PLATELET # BLD AUTO: 400 X10E3/UL (ref 150–450)
POTASSIUM SERPL-SCNC: 4.1 MMOL/L (ref 3.5–5.2)
PROT SERPL-MCNC: 6.8 G/DL (ref 6–8.5)
RBC # BLD AUTO: 5.16 X10E6/UL (ref 3.77–5.28)
SODIUM SERPL-SCNC: 140 MMOL/L (ref 134–144)
TSH SERPL DL<=0.005 MIU/L-ACNC: 1.83 UIU/ML (ref 0.45–4.5)
WBC # BLD AUTO: 9.4 X10E3/UL (ref 3.4–10.8)

## 2024-03-18 ENCOUNTER — TELEPHONE (OUTPATIENT)
Age: 69
End: 2024-03-18

## 2024-03-18 NOTE — TELEPHONE ENCOUNTER
Pt has appt kole and just wanted to make she she didn't forget to ask this question.      Pt is asking if her A1C has gone up.  She is asking if one of her other meds could be inter-acting to make the A1c go up.

## 2024-03-19 ENCOUNTER — OFFICE VISIT (OUTPATIENT)
Dept: ENDOCRINOLOGY | Facility: HOSPITAL | Age: 69
End: 2024-03-19
Payer: MEDICARE

## 2024-03-19 VITALS
HEART RATE: 76 BPM | OXYGEN SATURATION: 97 % | WEIGHT: 137 LBS | HEIGHT: 60 IN | SYSTOLIC BLOOD PRESSURE: 124 MMHG | DIASTOLIC BLOOD PRESSURE: 72 MMHG | BODY MASS INDEX: 26.9 KG/M2

## 2024-03-19 DIAGNOSIS — I10 ESSENTIAL HYPERTENSION: ICD-10-CM

## 2024-03-19 DIAGNOSIS — Z86.39 HISTORY OF PRIMARY HYPERPARATHYROIDISM: ICD-10-CM

## 2024-03-19 DIAGNOSIS — E78.2 MIXED HYPERLIPIDEMIA: ICD-10-CM

## 2024-03-19 DIAGNOSIS — E89.0 POSTOPERATIVE HYPOTHYROIDISM: ICD-10-CM

## 2024-03-19 DIAGNOSIS — E11.9 TYPE 2 DIABETES MELLITUS WITHOUT COMPLICATION, WITHOUT LONG-TERM CURRENT USE OF INSULIN (HCC): Primary | ICD-10-CM

## 2024-03-19 PROCEDURE — 99215 OFFICE O/P EST HI 40 MIN: CPT | Performed by: INTERNAL MEDICINE

## 2024-03-19 RX ORDER — FAMOTIDINE 20 MG
TABLET ORAL
COMMUNITY

## 2024-03-19 RX ORDER — CHOLECALCIFEROL (VITAMIN D3) 125 MCG
CAPSULE ORAL
COMMUNITY

## 2024-03-19 RX ORDER — MULTIVIT-MIN/IRON/FOLIC ACID/K 18-600-40
CAPSULE ORAL
COMMUNITY

## 2024-03-19 RX ORDER — GLIPIZIDE 10 MG/1
10 TABLET, FILM COATED, EXTENDED RELEASE ORAL DAILY
Qty: 90 TABLET | Refills: 2 | Status: SHIPPED | OUTPATIENT
Start: 2024-03-19

## 2024-03-19 RX ORDER — GLUCOSAM/CHON-MSM1/C/MANG/BOSW 750-644 MG
TABLET ORAL
COMMUNITY

## 2024-03-19 RX ORDER — CHLORAL HYDRATE 500 MG
1000 CAPSULE ORAL DAILY
COMMUNITY

## 2024-03-19 NOTE — PROGRESS NOTES
3/20/2024    Assessment/Plan     1. Type 2 diabetes mellitus without complication, without long-term current use of insulin ([facility])  Assessment & Plan:  Most recent hemoglobin A1c has gone up to 9.2%. This demonstrates markedly uncontrolled diabetes. At this point, I am not sure if her markedly elevated blood glucose are related to her having been on prednisone, which she is now off of. Her blood glucose fasting are not bad. For now, she will continue the same glipizide, Jardiance, and metformin. I will ask her to test her blood glucose twice a day before and 2 hours after a meal and vary the meals 3 days a week and do that for the next 2 to 3 weeks and send me the blood glucose for review, so I can make adjustments in medications if needed or if her blood glucose are not too bad, then to wait it out and see what her hemoglobin A1c is next visit after her prednisone effect is worn off.    Orders:  -     Comprehensive metabolic panel; Future; Expected date: 05/27/2024  -     Hemoglobin A1C; Future; Expected date: 05/27/2024  -     TSH, 3rd generation; Future; Expected date: 05/27/2024  -     T4, free; Future; Expected date: 05/27/2024  -     glipiZIDE (GLUCOTROL XL) 10 mg 24 hr tablet; Take 1 tablet (10 mg total) by mouth daily  -     Albumin / creatinine urine ratio; Future; Expected date: 05/27/2024  -     metFORMIN (GLUCOPHAGE) 1000 MG tablet; Take 1 tablet (1,000 mg total) by mouth daily with dinner 1000mg daily    2. Postoperative hypothyroidism  Assessment & Plan:  Most recent thyroid function studies are normal. She is biochemically and clinically euthyroid. We will continue the same levothyroxine 75 mcg daily.    Orders:  -     Comprehensive metabolic panel; Future; Expected date: 05/27/2024  -     Hemoglobin A1C; Future; Expected date: 05/27/2024  -     TSH, 3rd generation; Future; Expected date: 05/27/2024  -     T4, free; Future; Expected date: 05/27/2024  -     Albumin / creatinine urine ratio; Future;  Expected date: 05/27/2024    3. Essential hypertension  Assessment & Plan:  She is normotensive in the office on her current dose of verapamil, spironolactone, and metoprolol.    Orders:  -     Comprehensive metabolic panel; Future; Expected date: 05/27/2024  -     Hemoglobin A1C; Future; Expected date: 05/27/2024  -     TSH, 3rd generation; Future; Expected date: 05/27/2024  -     T4, free; Future; Expected date: 05/27/2024  -     Albumin / creatinine urine ratio; Future; Expected date: 05/27/2024    4. History of primary hyperparathyroidism  Assessment & Plan:  She is post parathyroid adenoma removal in the past. Most recent calcium is high normal with a normal phosphorus. She will continue to drink plenty of water and this will be followed over time.    Orders:  -     Comprehensive metabolic panel; Future; Expected date: 05/27/2024  -     Hemoglobin A1C; Future; Expected date: 05/27/2024  -     TSH, 3rd generation; Future; Expected date: 05/27/2024  -     T4, free; Future; Expected date: 05/27/2024  -     Albumin / creatinine urine ratio; Future; Expected date: 05/27/2024    5. Mixed hyperlipidemia  Assessment & Plan:  She will continue the same atorvastatin and fenofibrate.    Orders:  -     Comprehensive metabolic panel; Future; Expected date: 05/27/2024  -     Hemoglobin A1C; Future; Expected date: 05/27/2024  -     TSH, 3rd generation; Future; Expected date: 05/27/2024  -     T4, free; Future; Expected date: 05/27/2024  -     Albumin / creatinine urine ratio; Future; Expected date: 05/27/2024         I have asked her to follow up in 3 months with preceding hemoglobin A1c, CMP, TSH, free T4, and urine microalbumin to creatinine ratio.    I have spent a total time of 40 minutes on 3/19/2024 in caring for this patient including Diagnostic results, Prognosis, Risks and benefits of tx options, Instructions for management, Patient and family education, Importance of tx compliance, Risk factor reductions,  Impressions, Counseling / Coordination of care, Documenting in the medical record, Reviewing / ordering tests, medicine, procedures  , and Obtaining or reviewing history  .      CC: Diabetes type II, thyroid, blood pressure, lipid, parathyroid follow-up    History of Present Illness     HPI: Sarkis De Los Santos is a 69 y.o. year old female who has a history of type 2 diabetes, hypothyroidism post subtotal thyroidectomy, history of hyperparathyroidism post parathyroid adenoma removal, hypertension, and hyperlipidemia for a follow-up visit.    She was diagnosed with type 2 diabetes in 2018. She denies diabetic complications including neuropathy, retinopathy, nephropathy, heart attack, stroke, or claudication.    She is currently managing her diabetes with a regimen that includes oral hypoglycemic agents, such as Jardiance 25 mg once daily, glipizide XL 10 mg once daily, and metformin 1000 mg once daily in the evening meal. She ceased taking prednisone at the end of 12/2023 or the beginning of 01/2024. She does not report polyuria but she experiences nocturnal urination, waking up 2 to 3 times at night, and does not exhibit polyphagia but does experience polydipsia.    She mentions that her blood glucose levels fluctuate between 123 to 140, with occasional readings reaching 150 to 160. She does not monitor her glucose levels later in the day. A couple of months ago, she was concerned about her blood glucose readings dropping into the 70s or 80s, but she denies making any significant lifestyle changes, such as increased activity or altered eating habits, that could have influenced these readings.    Hypoglycemic episodes: No. She denies any low blood glucose readings and symptoms of hypoglycemia, such as shakiness and diaphoresis.    Blood Sugar/Glucometer/Pump/CGM review: She monitors her blood glucose once a day in the morning.  Blood sugar record over the last 3 weeks demonstrates blood sugars ranging from 123-160 5 in the  morning.    She is currently managing her thyroid condition with levothyroxine 75 mcg daily, following the removal of her thyroid. She has not experienced cold intolerance or hyperhidrosis but does experience heat sensitivity during the summer. She has no dysphagia or constipation but occasionally experiences diarrhea. She does not exhibit symptoms such as heart racing, palpitations, or tremors. Her sleep is generally good, and she does not feel excessively tired.    She is also on atorvastatin 40 mg once a day and fenofibrate 145 mg daily for cholesterol management. She denies any chest pain or dyspnea.     She is on metoprolol 50 mg twice daily, spironolactone 50 mg daily, and verapamil 180 mg daily for blood pressure management.    She takes Fosamax once a week but is not taking calcium due to high calcium levels in her blood work. She takes a vitamin D supplement but is unsure of the dose.    Her last eye exam was in 12/2023 with her new ophthalmologist, and she denies any blurry vision or changes in her eyes related to diabetes.     Her last foot exam was also in 12/2023, and she has no foot neuropathy. She sees her podiatrist every 8 to 9 weeks.        Last A1C was   Lab Results   Component Value Date    HGBA1C 9.2 (H) 03/14/2024   .        Review of Systems  The pertinent positive and negative findings are as noted in the HPI.    Historical Information   Past Medical History:   Diagnosis Date    Diabetes mellitus (HCC)     Disease of thyroid gland     hypo    Hyperlipidemia     Hyperparathyroidism (HCC)     Hypertension      Past Surgical History:   Procedure Laterality Date    PARATHYROID GLAND SURGERY  2011    left lower parathyroid removed    THYROIDECTOMY  2011    subtotal thyroidectomy     Social History   Social History     Substance and Sexual Activity   Alcohol Use Never     Social History     Substance and Sexual Activity   Drug Use Never     Social History     Tobacco Use   Smoking Status Never    Smokeless Tobacco Never     Family History:   Family History   Problem Relation Age of Onset    Thyroid disease unspecified Mother     Hypertension Father     Diabetes type II Sister     Colon polyps Neg Hx     Colon cancer Neg Hx        Meds/Allergies   Current Outpatient Medications   Medication Sig Dispense Refill    alendronate (FOSAMAX) 70 mg tablet Take 70 mg by mouth every 7 days      atorvastatin (LIPITOR) 40 mg tablet Take 40 mg by mouth daily  4    Empagliflozin 25 MG TABS Take by mouth 25 mg daily      fenofibrate (TRICOR) 145 mg tablet Take 145 mg by mouth daily  5    folic acid (FOLVITE) 1 mg tablet Take 1,000 mcg by mouth daily      glipiZIDE (GLUCOTROL XL) 10 mg 24 hr tablet Take 1 tablet (10 mg total) by mouth daily 90 tablet 2    Glucosamine-Chondroitin (Osteo Bi-Flex Regular Strength) 250-200 MG TABS Take by mouth      Lactobacillus (Probiotic Acidophilus) CAPS Take by mouth      levothyroxine 75 mcg tablet Take 75 mcg by mouth daily  5    Magnesium 100 MG CAPS Take by mouth      metFORMIN (GLUCOPHAGE) 1000 MG tablet Take 1 tablet (1,000 mg total) by mouth daily with dinner 1000mg daily 90 tablet 1    methotrexate 2.5 MG tablet 8 tablets once a week      metoprolol tartrate (LOPRESSOR) 50 mg tablet Take 50 mg by mouth 2 (two) times a day  3    Multiple Vitamins-Minerals (Multi For Her) CAPS Take by mouth      Omega-3 Fatty Acids (fish oil) 1,000 mg Take 1,000 mg by mouth daily      spironolactone (ALDACTONE) 50 mg tablet Take 50 mg by mouth daily  3    verapamil (CALAN-SR) 180 mg CR tablet Take 180 mg by mouth daily  3    Vitamin D, Cholecalciferol, 25 MCG (1000 UT) CAPS Take by mouth       No current facility-administered medications for this visit.     Allergies   Allergen Reactions    Atenolol Swelling    Hydrochlorothiazide Other (See Comments)     Pt thinks she got Gout.     Lisinopril Other (See Comments)     Pt does not remember the reaction.        Objective   Vitals: Blood pressure  124/72, pulse 76, height 5' (1.524 m), weight 62.1 kg (137 lb), SpO2 97%.  Invasive Devices       None                   Physical Exam  Physical exam normal with pertinent positives and negatives.  Neck exam: Healed anterior neck scar. No palpable thyroid tissue. No lymphadenopathy of the neck.  Respiratory: Lungs are clear.  Musculoskeletal: No CVA tenderness. No spinous process tenderness.  Neurological: No tremor of the outstretched hands. Patellar deep tendon reflexes normal.    The history was obtained from the review of the chart and from the patient.    Lab Results:    Most recent Alc is  Lab Results   Component Value Date    HGBA1C 9.2 (H) 03/14/2024               Lab Results   Component Value Date    CREATININE 0.86 03/14/2024    BUN 25 03/14/2024    K 4.1 03/14/2024     03/14/2024    CO2 22 03/14/2024     eGFR   Date Value Ref Range Status   03/14/2024 74 >59 mL/min/1.73 Final         Lab Results   Component Value Date    ALT 20 03/14/2024    AST 19 03/14/2024       Lab Results   Component Value Date    TSH 1.830 03/14/2024         CMP showed a glucose of 173 fasting, BUN/creatinine ratio 29, was otherwise normal.     CBC is normal.     Phosphorus is 3.7.    Future Appointments   Date Time Provider Department Center   6/25/2024  1:40 PM Sydnie Hunter MD [facility]       Transcribed for Sydnie Hunter MD, by Maco Dumont on 03/20/24 at 6:02 PM. Powered by Dragon [employer].

## 2024-03-20 ENCOUNTER — TELEPHONE (OUTPATIENT)
Dept: ADMINISTRATIVE | Facility: OTHER | Age: 69
End: 2024-03-20

## 2024-03-20 NOTE — LETTER
Diabetic Eye Exam Form    Date Requested: 24  Patient: Sarkis De Los Santos  Patient : 1955   Referring Provider: TROY Wilkes      DIABETIC Eye Exam Date _______________________________      Type of Exam MUST be documented for Diabetic Eye Exams. Please CHECK ONE.     Retinal Exam       Dilated Retinal Exam       OCT       Optomap-Iris Exam      Fundus Photography       Left Eye - Please check Retinopathy or No Retinopathy        Exam did show retinopathy    Exam did not show retinopathy       Right Eye - Please check Retinopathy or No Retinopathy       Exam did show retinopathy    Exam did not show retinopathy       Comments __________________________________________________________    Practice Providing Exam ______________________________________________    Exam Performed By (print name) _______________________________________      Provider Signature ___________________________________________________      These reports are needed for  compliance.  Please fax this completed form and a copy of the Diabetic Eye Exam report to our office located at [address] [place] [address] as soon as possible via Fax [phone] attention [place]: Phone [phone]  We thank you for your assistance in treating our mutual patient.    [facility] - [phone] F [phone]

## 2024-03-20 NOTE — ASSESSMENT & PLAN NOTE
Most recent hemoglobin A1c has gone up to 9.2%. This demonstrates markedly uncontrolled diabetes. At this point, I am not sure if her markedly elevated blood glucose are related to her having been on prednisone, which she is now off of. Her blood glucose fasting are not bad. For now, she will continue the same glipizide, Jardiance, and metformin. I will ask her to test her blood glucose twice a day before and 2 hours after a meal and vary the meals 3 days a week and do that for the next 2 to 3 weeks and send me the blood glucose for review, so I can make adjustments in medications if needed or if her blood glucose are not too bad, then to wait it out and see what her hemoglobin A1c is next visit after her prednisone effect is worn off.

## 2024-03-20 NOTE — TELEPHONE ENCOUNTER
Upon review of the In Basket request and the patient's chart, initial outreach has been made via fax to facility. Please see Contacts section for details.     Thank you  Zia Connelly

## 2024-03-20 NOTE — ASSESSMENT & PLAN NOTE
She is post parathyroid adenoma removal in the past. Most recent calcium is high normal with a normal phosphorus. She will continue to drink plenty of water and this will be followed over time.

## 2024-03-20 NOTE — ASSESSMENT & PLAN NOTE
Most recent thyroid function studies are normal. She is biochemically and clinically euthyroid. We will continue the same levothyroxine 75 mcg daily.

## 2024-03-20 NOTE — ASSESSMENT & PLAN NOTE
She is normotensive in the office on her current dose of verapamil, spironolactone, and metoprolol.

## 2024-03-20 NOTE — LETTER
Diabetic Eye Exam Form    Date Requested: 24  Patient: Sarkis De Los Santos  Patient : 1955   Referring Provider: TROY Wilkes      DIABETIC Eye Exam Date _______________________________      Type of Exam MUST be documented for Diabetic Eye Exams. Please CHECK ONE.     Retinal Exam       Dilated Retinal Exam       OCT       Optomap-Iris Exam      Fundus Photography       Left Eye - Please check Retinopathy or No Retinopathy        Exam did show retinopathy    Exam did not show retinopathy       Right Eye - Please check Retinopathy or No Retinopathy       Exam did show retinopathy    Exam did not show retinopathy       Comments __________________________________________________________    Practice Providing Exam ______________________________________________    Exam Performed By (print name) _______________________________________      Provider Signature ___________________________________________________      These reports are needed for  compliance.  Please fax this completed form and a copy of the Diabetic Eye Exam report to our office located at [address] [place] [address] as soon as possible via Fax [phone] attention [place]: Phone [phone]  We thank you for your assistance in treating our mutual patient.    [facility] - [phone] F [phone]

## 2024-03-20 NOTE — TELEPHONE ENCOUNTER
----- Message from Anna Oneil MA sent at 3/20/2024 10:28 AM EDT -----  Regarding: diabetic eye exam  03/20/24 10:28 AM    Hello, our patient Sarkis De Los Santos has had Diabetic Eye Exam completed/performed. Please assist in updating the patient chart by making an External outreach to [facility] facility located in [facility]. The date of service is 2024.    Thank you,  Anna Oneil MA  [facility]

## 2024-03-26 NOTE — TELEPHONE ENCOUNTER
As a follow-up, a second attempt has been made for outreach via fax to facility. Please see Contacts section for details.    Thank you  Zia Connelly

## 2024-04-03 NOTE — TELEPHONE ENCOUNTER
As a final attempt, a third outreach has been made via telephone call to facility. Please see Contacts section for details. This encounter will be closed and completed by end of day. Should we receive the requested information because of previous outreach attempts, the requested patient's chart will be updated appropriately.     Thank you  Zia Connelly

## 2024-06-22 LAB
ALBUMIN SERPL-MCNC: 4.5 G/DL (ref 3.9–4.9)
ALBUMIN/CREAT UR: 15 MG/G CREAT (ref 0–29)
ALP SERPL-CCNC: 49 IU/L (ref 44–121)
ALT SERPL-CCNC: 19 IU/L (ref 0–32)
AST SERPL-CCNC: 15 IU/L (ref 0–40)
BILIRUB SERPL-MCNC: 0.4 MG/DL (ref 0–1.2)
BUN SERPL-MCNC: 20 MG/DL (ref 8–27)
BUN/CREAT SERPL: 25 (ref 12–28)
CALCIUM SERPL-MCNC: 10.2 MG/DL (ref 8.7–10.3)
CHLORIDE SERPL-SCNC: 104 MMOL/L (ref 96–106)
CO2 SERPL-SCNC: 22 MMOL/L (ref 20–29)
CREAT SERPL-MCNC: 0.79 MG/DL (ref 0.57–1)
CREAT UR-MCNC: 65.4 MG/DL
EGFR: 82 ML/MIN/1.73
GLOBULIN SER-MCNC: 2.1 G/DL (ref 1.5–4.5)
GLUCOSE SERPL-MCNC: 147 MG/DL (ref 70–99)
HBA1C MFR BLD: 8.4 % (ref 4.8–5.6)
MICROALBUMIN UR-MCNC: 9.8 UG/ML
POTASSIUM SERPL-SCNC: 4.6 MMOL/L (ref 3.5–5.2)
PROT SERPL-MCNC: 6.6 G/DL (ref 6–8.5)
SODIUM SERPL-SCNC: 142 MMOL/L (ref 134–144)
T4 FREE SERPL-MCNC: 1.6 NG/DL (ref 0.82–1.77)
TSH SERPL DL<=0.005 MIU/L-ACNC: 1.06 UIU/ML (ref 0.45–4.5)

## 2024-06-25 ENCOUNTER — OFFICE VISIT (OUTPATIENT)
Dept: ENDOCRINOLOGY | Facility: HOSPITAL | Age: 69
End: 2024-06-25
Payer: MEDICARE

## 2024-06-25 VITALS
DIASTOLIC BLOOD PRESSURE: 80 MMHG | SYSTOLIC BLOOD PRESSURE: 124 MMHG | WEIGHT: 137.6 LBS | HEIGHT: 60 IN | HEART RATE: 78 BPM | BODY MASS INDEX: 27.01 KG/M2

## 2024-06-25 DIAGNOSIS — E78.2 MIXED HYPERLIPIDEMIA: ICD-10-CM

## 2024-06-25 DIAGNOSIS — I10 ESSENTIAL HYPERTENSION: ICD-10-CM

## 2024-06-25 DIAGNOSIS — E89.0 POSTOPERATIVE HYPOTHYROIDISM: ICD-10-CM

## 2024-06-25 DIAGNOSIS — E11.9 TYPE 2 DIABETES MELLITUS WITHOUT COMPLICATION, WITHOUT LONG-TERM CURRENT USE OF INSULIN (HCC): Primary | ICD-10-CM

## 2024-06-25 DIAGNOSIS — Z86.39 HISTORY OF PRIMARY HYPERPARATHYROIDISM: ICD-10-CM

## 2024-06-25 PROCEDURE — 99214 OFFICE O/P EST MOD 30 MIN: CPT | Performed by: INTERNAL MEDICINE

## 2024-06-25 RX ORDER — GLIPIZIDE 5 MG/1
TABLET, FILM COATED, EXTENDED RELEASE ORAL
Qty: 90 TABLET | Refills: 3 | Status: SHIPPED | OUTPATIENT
Start: 2024-06-25

## 2024-06-25 NOTE — PROGRESS NOTES
6/25/2024    Assessment & Plan      Diagnoses and all orders for this visit:    Type 2 diabetes mellitus without complication, without long-term current use of insulin (HCC)  -     glipiZIDE (GLUCOTROL XL) 5 mg 24 hr tablet; Take 1 daily in am with glipizide XL 10 mg tablet.  -     Comprehensive metabolic panel; Future  -     Hemoglobin A1C; Future  -     TSH, 3rd generation; Future  -     T4, free; Future    Postoperative hypothyroidism  -     Comprehensive metabolic panel; Future  -     Hemoglobin A1C; Future  -     TSH, 3rd generation; Future  -     T4, free; Future    History of primary hyperparathyroidism  -     Comprehensive metabolic panel; Future  -     Hemoglobin A1C; Future  -     TSH, 3rd generation; Future  -     T4, free; Future    Essential hypertension  -     Comprehensive metabolic panel; Future  -     Hemoglobin A1C; Future  -     TSH, 3rd generation; Future  -     T4, free; Future    Mixed hyperlipidemia  -     Comprehensive metabolic panel; Future  -     Hemoglobin A1C; Future  -     TSH, 3rd generation; Future  -     T4, free; Future          Assessment & Plan  1. Type 2 diabetes.  The patient's most recent hemoglobin A1c level is 8.4 percent, which is an improvement from the previous visit, albeit not yet within the desired range. The patient is advised to maintain her current regimen of metformin and Jardiance, which is currently maximized. However, the dosage of glipizide XL will be increased to 10 mg and a 5 mg tablet daily for a total of 15 mg daily. She is advised to continue monitoring her blood glucose levels once daily.    2. Hypothyroidism post thyroidectomy.  The patient's most recent thyroid function studies are within normal limits. She is biochemically euthyroid and will persist with the daily dose of levothyroxine 75 mcg.    3. History of primary hyperparathyroidism.  Post parathyroid adenoma removal, the patient's thyroid and calcium levels are within the normal range. Follow-up  will be conducted over time.    4. Hypertension.  The patient's blood pressure is normotensive in the office. The patient will maintain her current regimen of metoprolol, spironolactone, and verapamil.    5. Hyperlipidemia.  The patient is under the care of a cardiologist for this condition and will persist with the current regimen of atorvastatin and fenofibrate.    Follow-up  A follow-up visit is scheduled for 3 months from now, with pre-visit hemoglobin A1c, CMP, TSH, and free T4 tests to be conducted prior to the visit.        CC: Diabetes type II, thyroid, parathyroid, blood pressure, lipid follow-up     History of Present Illness    HPI: Sarkis De Los Santos is a 69-year-old female with history of type 2 diabetes diagnosed 6 years ago, hypothyroidism post subtotal thyroidectomy, history of hyperparathyroidism post parathyroid adenoma removal, hypertension, hyperlipidemia, for follow-up visit.     She denies diabetic complications including neuropathy, retinopathy, nephropathy, heart attack, stroke, or claudication.    The patient is currently on a regimen of glipizide XL 10 mg daily, metformin 1000 mg with dinner, and Jardiance 25 mg once daily for diabetes management. She reports occasional urinary frequency, nocturia, and polydipsia. Her last ophthalmological examination was conducted in 12/2023. She regularly consults with a podiatrist and denies any foot wounds.     She was diagnosed with primary hyperparathyroidism in 2011. She had 1 thyroid nodule which was biopsied and did not have enough material for evaluation. She had subtotal thyroidectomy and left lower parathyroidectomy in 2011 by Dr. Gary Finkelstein. Based on the pathological findings in 04/20211, cancer was not detected.    Post-thyroidectomy, the patient is currently on levothyroxine 75 mcg and reports feeling well. However, she experiences episodes of heat and perspiration, particularly when outdoors. She denies experiencing tachycardia, palpitations,  tremors, diarrhea, or hair loss. Her sleep pattern is satisfactory, and she denies daytime fatigue.    She is on vitamin D 1000 units a day. She is not on calcium right now. She is on Fosamax once a week for her osteoporosis.     She is on fenofibrate 145 mg a day and atorvastatin 40 mg a day for hyperlipidemia she is on verapamil 180 mg a day, spironolactone 50 mg a day, and metoprolol 50 mg twice a day for hypertension.  She denies headaches, lightheadedness, or dizziness.    Blood Sugar/Glucometer/Pump/CGM review: She checks blood sugars once daily.  No blood sugar record was available in the office today.  She reports her blood glucose level was recorded as 132 this morning, with occasional readings around 115.        Historical Information   Past Medical History:   Diagnosis Date    Diabetes mellitus (HCC)     Disease of thyroid gland     hypo    Hyperlipidemia     Hyperparathyroidism (HCC)     Hypertension      Past Surgical History:   Procedure Laterality Date    PARATHYROID GLAND SURGERY  2011    left lower parathyroid removed    THYROIDECTOMY  2011    subtotal thyroidectomy     Social History   Social History     Substance and Sexual Activity   Alcohol Use Never     Social History     Substance and Sexual Activity   Drug Use Never     Social History     Tobacco Use   Smoking Status Never   Smokeless Tobacco Never     Family History:   Family History   Problem Relation Age of Onset    Thyroid disease unspecified Mother     Hypertension Father     Diabetes type II Sister     Colon polyps Neg Hx     Colon cancer Neg Hx        Meds/Allergies   Current Outpatient Medications   Medication Sig Dispense Refill    alendronate (FOSAMAX) 70 mg tablet Take 70 mg by mouth every 7 days      atorvastatin (LIPITOR) 40 mg tablet Take 40 mg by mouth daily  4    Empagliflozin 25 MG TABS Take by mouth 25 mg daily      fenofibrate (TRICOR) 145 mg tablet Take 145 mg by mouth daily  5    folic acid (FOLVITE) 1 mg tablet Take  1,000 mcg by mouth daily      glipiZIDE (GLUCOTROL XL) 10 mg 24 hr tablet Take 1 tablet (10 mg total) by mouth daily 90 tablet 2    glipiZIDE (GLUCOTROL XL) 5 mg 24 hr tablet Take 1 daily in am with glipizide XL 10 mg tablet. 90 tablet 3    Glucosamine-Chondroitin (Osteo Bi-Flex Regular Strength) 250-200 MG TABS Take by mouth      Lactobacillus (Probiotic Acidophilus) CAPS Take by mouth      levothyroxine 75 mcg tablet Take 75 mcg by mouth daily  5    Magnesium 100 MG CAPS Take by mouth      metFORMIN (GLUCOPHAGE) 1000 MG tablet Take 1 tablet (1,000 mg total) by mouth daily with dinner 1000mg daily 90 tablet 1    methotrexate 2.5 MG tablet 8 tablets once a week      metoprolol tartrate (LOPRESSOR) 50 mg tablet Take 50 mg by mouth 2 (two) times a day  3    Multiple Vitamins-Minerals (Multi For Her) CAPS Take by mouth      Omega-3 Fatty Acids (fish oil) 1,000 mg Take 1,000 mg by mouth daily      spironolactone (ALDACTONE) 50 mg tablet Take 50 mg by mouth daily  3    verapamil (CALAN-SR) 180 mg CR tablet Take 180 mg by mouth daily  3    Vitamin D, Cholecalciferol, 25 MCG (1000 UT) CAPS Take by mouth       No current facility-administered medications for this visit.     Allergies   Allergen Reactions    Atenolol Swelling    Hydrochlorothiazide Other (See Comments)     Pt thinks she got Gout.     Lisinopril Other (See Comments)     Pt does not remember the reaction.        Objective   Vitals: Blood pressure 124/80, pulse 78, height 5' (1.524 m), weight 62.4 kg (137 lb 9.6 oz).  Invasive Devices       None                   Physical Exam  Physical exam normal with pertinent positives and negatives.    No lid lag, stare, proptosis, or periorbital edema.  Healed anterior neck scar in the neck. No palpable thyroid tissue. No lymphadenopathy or masses in the neck.  Lungs are clear to auscultation.  Heart has a regular rate and rhythm. No murmurs.  No tremor of the outstretched hands in the musculoskeletal system. Patellar  deep tendon reflexes are normal.      The history was obtained from the review of the chart and from the patient.    Lab Results:    Most recent Alc is  Lab Results   Component Value Date    HGBA1C 8.4 (H) 06/21/2024           Blood work performed on 6/21/2024 showed a urine microalbumin to creatinine ratio of 15.    CMP showed a glucose of 147 fasting but was otherwise normal.    Lab Results   Component Value Date    CREATININE 0.79 06/21/2024    CREATININE 0.86 03/14/2024    BUN 20 06/21/2024    K 4.6 06/21/2024     06/21/2024    CO2 22 06/21/2024     eGFR   Date Value Ref Range Status   06/21/2024 82 >59 mL/min/1.73 Final         Lab Results   Component Value Date    ALT 19 06/21/2024    AST 15 06/21/2024       Lab Results   Component Value Date    TSH 1.060 06/21/2024    FREET4 1.60 06/21/2024             Future Appointments   Date Time Provider Department Center   10/1/2024  3:00 PM Sydnie Hunter MD [facility]

## 2024-06-25 NOTE — PATIENT INSTRUCTIONS
Hgba1c is 8.4%. this is improved but still too high.     We have you on the maximum dose of jardiance and metformin. We can increase the glipizide.     We increase glipizide to a  10 and a 5 mg pill daily.     Continue to test blood sugars up to once daily.     The thyroid is normal.     Continue the same levothyroxine dosage.     Follow up in 3 months with blood work.   
negative...

## 2024-06-26 ENCOUNTER — TELEPHONE (OUTPATIENT)
Dept: ADMINISTRATIVE | Facility: OTHER | Age: 69
End: 2024-06-26

## 2024-06-26 NOTE — TELEPHONE ENCOUNTER
Upon review of the In Basket request and the patient's chart, initial outreach has been made via fax to facility. Please see Contacts section for details.     Thank you  Mechelle Hoang MA

## 2024-06-26 NOTE — LETTER
Diabetic Eye Exam Form    Date Requested: 24  Patient: Sarkis De Los Santos  Patient : 1955   Referring Provider: TROY Wilkes      DIABETIC Eye Exam Date _______________________________      Type of Exam MUST be documented for Diabetic Eye Exams. Please CHECK ONE.     Retinal Exam       Dilated Retinal Exam       OCT       Optomap-Iris Exam      Fundus Photography       Left Eye - Please check Retinopathy or No Retinopathy        Exam did show retinopathy    Exam did not show retinopathy       Right Eye - Please check Retinopathy or No Retinopathy       Exam did show retinopathy    Exam did not show retinopathy       Comments __________________________________________________________    Practice Providing Exam ______________________________________________    Exam Performed By (print name) _______________________________________      Provider Signature ___________________________________________________      These reports are needed for  compliance.  Please fax this completed form and a copy of the Diabetic Eye Exam report to our office located at [address] [place] [address] as soon as possible via Fax [phone] attention Mechelle: Phone [phone]  We thank you for your assistance in treating our mutual patient.

## 2024-06-26 NOTE — TELEPHONE ENCOUNTER
----- Message from Tg COATS sent at 6/25/2024  2:02 PM EDT -----  Regarding: DM EYE EXAM  06/25/24 2:03 PM    Hello, our patient Sarkis De Los Santos has had a DM Eye Exam performed at [facility]. Their number is [phone]    Thank you,  Tg Mullen [facility] [place]

## 2024-07-08 NOTE — TELEPHONE ENCOUNTER
As a final attempt, a third outreach has been made via fax to facility. Please see Contacts section for details. This encounter will be closed and completed by end of day. Should we receive the requested information because of previous outreach attempts, the requested patient's chart will be updated appropriately.     Thank you  Mechelle Hoang MA

## 2024-07-09 NOTE — TELEPHONE ENCOUNTER
Upon review of the In Basket request we were able to locate, review, and update the patient chart as requested for Diabetic Eye Exam.    Any additional questions or concerns should be emailed to the Practice Liaisons via the appropriate education email address, please do not reply via In Basket.    Thank you  Mechelle Hoang MA   [facility]

## 2024-07-09 NOTE — TELEPHONE ENCOUNTER
As a final attempt, a third outreach has been made via telephone call to facility. Please see Contacts section for details. This encounter will be closed and completed by end of day. Should we receive the requested information because of previous outreach attempts, the requested patient's chart will be updated appropriately.     Called 7/9 - faxing over today    Thank you  Mechelle Hoang MA

## 2024-09-28 LAB
ALBUMIN SERPL-MCNC: 4.4 G/DL (ref 3.9–4.9)
ALP SERPL-CCNC: 57 IU/L (ref 44–121)
ALT SERPL-CCNC: 18 IU/L (ref 0–32)
AST SERPL-CCNC: 20 IU/L (ref 0–40)
BILIRUB SERPL-MCNC: 0.5 MG/DL (ref 0–1.2)
BUN SERPL-MCNC: 21 MG/DL (ref 8–27)
BUN/CREAT SERPL: 24 (ref 12–28)
CALCIUM SERPL-MCNC: 10.2 MG/DL (ref 8.7–10.3)
CHLORIDE SERPL-SCNC: 102 MMOL/L (ref 96–106)
CO2 SERPL-SCNC: 19 MMOL/L (ref 20–29)
CREAT SERPL-MCNC: 0.88 MG/DL (ref 0.57–1)
EGFR: 72 ML/MIN/1.73
GLOBULIN SER-MCNC: 2.3 G/DL (ref 1.5–4.5)
GLUCOSE SERPL-MCNC: 114 MG/DL (ref 70–99)
HBA1C MFR BLD: 8 % (ref 4.8–5.6)
POTASSIUM SERPL-SCNC: 4.4 MMOL/L (ref 3.5–5.2)
PROT SERPL-MCNC: 6.7 G/DL (ref 6–8.5)
SODIUM SERPL-SCNC: 141 MMOL/L (ref 134–144)
T4 FREE SERPL-MCNC: 1.99 NG/DL (ref 0.82–1.77)
T4 SERPL-MCNC: 10.6 UG/DL (ref 4.5–12)
TSH SERPL DL<=0.005 MIU/L-ACNC: 0.5 UIU/ML (ref 0.45–4.5)

## 2024-10-01 ENCOUNTER — OFFICE VISIT (OUTPATIENT)
Dept: ENDOCRINOLOGY | Facility: HOSPITAL | Age: 69
End: 2024-10-01
Payer: MEDICARE

## 2024-10-01 VITALS
BODY MASS INDEX: 26.15 KG/M2 | HEART RATE: 85 BPM | DIASTOLIC BLOOD PRESSURE: 80 MMHG | WEIGHT: 133.2 LBS | SYSTOLIC BLOOD PRESSURE: 122 MMHG | HEIGHT: 60 IN

## 2024-10-01 DIAGNOSIS — I10 ESSENTIAL HYPERTENSION: ICD-10-CM

## 2024-10-01 DIAGNOSIS — E78.2 MIXED HYPERLIPIDEMIA: ICD-10-CM

## 2024-10-01 DIAGNOSIS — E11.9 TYPE 2 DIABETES MELLITUS WITHOUT COMPLICATION, WITHOUT LONG-TERM CURRENT USE OF INSULIN (HCC): Primary | ICD-10-CM

## 2024-10-01 DIAGNOSIS — Z86.39 HISTORY OF PRIMARY HYPERPARATHYROIDISM: ICD-10-CM

## 2024-10-01 DIAGNOSIS — E89.0 POSTOPERATIVE HYPOTHYROIDISM: ICD-10-CM

## 2024-10-01 PROCEDURE — 99214 OFFICE O/P EST MOD 30 MIN: CPT | Performed by: INTERNAL MEDICINE

## 2024-10-01 RX ORDER — INFLIXIMAB 100 MG/10ML
INJECTION, POWDER, LYOPHILIZED, FOR SOLUTION INTRAVENOUS
COMMUNITY

## 2024-10-01 NOTE — PATIENT INSTRUCTIONS
Hgba1c is 8.0%. this is improved but at goal.     Continue the same glipizide, metformin, and jardiance for now.     Continue to work on diet and exercise.     Continue to check blood sugars once daily.     The thyroid is normal.     Continue the same levothyroxine.     The calcium is normal.     Continue to drink plenty of water.     Follow up in 4 months with blood work.

## 2024-10-01 NOTE — PROGRESS NOTES
10/2/2024    Assessment & Plan      Diagnoses and all orders for this visit:    Type 2 diabetes mellitus without complication, without long-term current use of insulin (HCC)  -     Comprehensive metabolic panel; Future  -     T4, free; Future  -     TSH, 3rd generation; Future  -     Hemoglobin A1C; Future  -     Lipid Panel with Direct LDL reflex; Future  -     Comprehensive metabolic panel  -     T4, free  -     TSH, 3rd generation  -     Hemoglobin A1C  -     Lipid Panel with Direct LDL reflex    Postoperative hypothyroidism  -     Comprehensive metabolic panel; Future  -     T4, free; Future  -     TSH, 3rd generation; Future  -     Hemoglobin A1C; Future  -     Lipid Panel with Direct LDL reflex; Future  -     Comprehensive metabolic panel  -     T4, free  -     TSH, 3rd generation  -     Hemoglobin A1C  -     Lipid Panel with Direct LDL reflex    Essential hypertension  -     Comprehensive metabolic panel; Future  -     T4, free; Future  -     TSH, 3rd generation; Future  -     Hemoglobin A1C; Future  -     Lipid Panel with Direct LDL reflex; Future  -     Comprehensive metabolic panel  -     T4, free  -     TSH, 3rd generation  -     Hemoglobin A1C  -     Lipid Panel with Direct LDL reflex    History of primary hyperparathyroidism  -     Comprehensive metabolic panel; Future  -     T4, free; Future  -     TSH, 3rd generation; Future  -     Hemoglobin A1C; Future  -     Lipid Panel with Direct LDL reflex; Future  -     Comprehensive metabolic panel  -     T4, free  -     TSH, 3rd generation  -     Hemoglobin A1C  -     Lipid Panel with Direct LDL reflex    Mixed hyperlipidemia  -     Comprehensive metabolic panel; Future  -     T4, free; Future  -     TSH, 3rd generation; Future  -     Hemoglobin A1C; Future  -     Lipid Panel with Direct LDL reflex; Future  -     Comprehensive metabolic panel  -     T4, free  -     TSH, 3rd generation  -     Hemoglobin A1C  -     Lipid Panel with Direct LDL reflex    Other  orders  -     inFLIXimab (Remicade) 100 mg; Inject into a catheter in a vein          Assessment & Plan  1. Type 2 Diabetes Mellitus.  The most recent hemoglobin A1c is 8%, which is an improvement but still higher than desired. She will continue her current regimen of Jardiance 25 mg daily, glipizide XL 15 mg in the morning, and metformin 1000 mg with dinner. She has been advised to maintain regular exercise, dietary changes, and adequate hydration. She will continue to test her blood sugars once daily. If her blood sugar levels do not improve, adjustments to her medication may be considered.    2. Diabetic Neuropathy.  She reports numbness and tingling in her feet but no wounds. She will continue her current diabetes management plan to help control these symptoms.    3. Diabetic Retinopathy.  Her last eye doctor visit was in December 2023, and she reports no current blurry vision. She will continue regular follow-ups with her eye doctor.    4. Diabetic Nephropathy.  She will continue her current diabetes management plan to help control this condition.    5. Hypothyroidism post thyroidectomy.  Most recent thyroid function studies show a normal TSH, indicating biochemical euthyroidism. She will continue taking levothyroxine 75 mcg daily.    6. Hypertension.  She is normotensive in the office. She will continue her current doses of metoprolol 50 mg twice a day, spironolactone 50 mg daily, and verapamil 180 mg daily.    7. Hyperparathyroidism.  She is post parathyroid adenoma removal, and her most recent calcium levels are normal. She will continue to stay hydrated.    8. Hyperlipidemia.  She will continue her current doses of atorvastatin 40 mg daily and fenofibrate 145 mg daily. A lipid profile will be repeated at her next visit.    Follow-up  Return in 4 months with preceding hemoglobin A1c, CMP, lipid panel, TSH, and free T4.        CC: Diabetes type II, blood pressure, lipid, thyroid, parathyroid follow-up      History of Present Illness    HPI: Sarkis De Los Santos is a 69-year-old female with a history of type 2 diabetes diagnosed in 2018, hypothyroidism post subtotal thyroidectomy, hyperparathyroidism post parathyroid adenoma removal, hypertension, and hyperlipidemia. She is here for a follow-up visit.    Her current diabetic medications include Jardiance 25 mg daily, glipizide XL 10 mg and 5 mg (total 15 mg) in the morning, and metformin 1000 mg with dinner.She reports frequent urination, including twice during the night, and increased thirst. She does not experience blurry vision.     Her last eye examination was in December 2023.     She does not have numbness or tingling in her feet and has no wounds on her feet. Her last foot exam was also in December 2023. She regularly visits a podiatrist at [facility], with her most recent visit being last week.     She does not experience tremors, diarrhea, constipation, difficulty swallowing, chest pain, shortness of breath, lightheadedness, dizziness upon standing, or headaches.     She denies diabetes complications and denies neuropathy, nephropathy, retinopathy, heart attack, stroke, or claudication.    She started Remicade in August 2024 for rheumatoid arthritis and is also taking alendronate for osteoporosis.     She reports feeling hot and sweaty during the summer months but does not feel excessively cold. She does not experience heart palpitations. She is currently taking levothyroxine 75 mcg daily for her hypothyroidism.    Her current medications for blood pressure management include metoprolol 50 mg twice daily, spironolactone 50 mg daily, and verapamil 180 mg daily.    Blood Sugar/Glucometer/Pump/CGM review: She monitors her blood sugar levels daily, usually in the morning, with today's reading being 134. She has eliminated snacking from her diet and does not consume coffee or tea.  She denies hypoglycemia.    Historical Information   Past Medical History:    Diagnosis Date    Diabetes mellitus (HCC)     Disease of thyroid gland     hypo    Hyperlipidemia     Hyperparathyroidism (HCC)     Hypertension     Osteoporosis     Rheumatoid arthritis (HCC)      Past Surgical History:   Procedure Laterality Date    PARATHYROID GLAND SURGERY  2011    left lower parathyroid removed    THYROIDECTOMY  2011    subtotal thyroidectomy     Social History   Social History     Substance and Sexual Activity   Alcohol Use Never     Social History     Substance and Sexual Activity   Drug Use Never     Social History     Tobacco Use   Smoking Status Never   Smokeless Tobacco Never     Family History:   Family History   Problem Relation Age of Onset    Thyroid disease unspecified Mother     Hypertension Father     Diabetes type II Sister     Colon polyps Neg Hx     Colon cancer Neg Hx        Meds/Allergies   Current Outpatient Medications   Medication Sig Dispense Refill    alendronate (FOSAMAX) 70 mg tablet Take 70 mg by mouth every 7 days      atorvastatin (LIPITOR) 40 mg tablet Take 40 mg by mouth daily  4    Empagliflozin 25 MG TABS Take by mouth 25 mg daily      fenofibrate (TRICOR) 145 mg tablet Take 145 mg by mouth daily  5    folic acid (FOLVITE) 1 mg tablet Take 1,000 mcg by mouth daily      glipiZIDE (GLUCOTROL XL) 10 mg 24 hr tablet Take 1 tablet (10 mg total) by mouth daily 90 tablet 2    glipiZIDE (GLUCOTROL XL) 5 mg 24 hr tablet Take 1 daily in am with glipizide XL 10 mg tablet. 90 tablet 3    Glucosamine-Chondroitin (Osteo Bi-Flex Regular Strength) 250-200 MG TABS Take by mouth      inFLIXimab (Remicade) 100 mg Inject into a catheter in a vein      Lactobacillus (Probiotic Acidophilus) CAPS Take by mouth      levothyroxine 75 mcg tablet Take 75 mcg by mouth daily  5    Magnesium 100 MG CAPS Take by mouth      metFORMIN (GLUCOPHAGE) 1000 MG tablet Take 1 tablet (1,000 mg total) by mouth daily with dinner 1000mg daily 90 tablet 1    methotrexate 2.5 MG tablet 8 tablets once a  week      metoprolol tartrate (LOPRESSOR) 50 mg tablet Take 50 mg by mouth 2 (two) times a day  3    Multiple Vitamins-Minerals (Multi For Her) CAPS Take by mouth      Omega-3 Fatty Acids (fish oil) 1,000 mg Take 1,000 mg by mouth daily      spironolactone (ALDACTONE) 50 mg tablet Take 50 mg by mouth daily  3    verapamil (CALAN-SR) 180 mg CR tablet Take 180 mg by mouth daily  3    Vitamin D, Cholecalciferol, 25 MCG (1000 UT) CAPS Take by mouth (Patient not taking: Reported on 10/1/2024)       No current facility-administered medications for this visit.     Allergies   Allergen Reactions    Atenolol Swelling    Hydrochlorothiazide Other (See Comments)     Pt thinks she got Gout.     Lisinopril Other (See Comments)     Pt does not remember the reaction.        Objective   Vitals: Blood pressure 122/80, pulse 85, height 5' (1.524 m), weight 60.4 kg (133 lb 3.2 oz).  Invasive Devices       None                   Physical Exam    Eyes show no lid lag, stare, proptosis or periorbital edema.  Neck shows a healed anterior neck scar. No palpable thyroid tissue. No lymphadenopathy. No masses of the neck.  Lungs are clear to auscultation.  Heart has a regular rate and rhythm. No murmurs.  No CVA tenderness in the gastrointestinal area.  No tremor in the outstretched hands. No lower extremity edema. Patellar deep tendon reflexes are normal.      The history was obtained from the review of the chart and from the patient.    Lab Results:    Most recent Alc is  Lab Results   Component Value Date    HGBA1C 8.0 (H) 09/27/2024           Blood work performed on 9/27/2024 showed a CMP with a glucose of 114 fasting, CO2 of 19, but was otherwise normal.    Lab Results   Component Value Date    CREATININE 0.88 09/27/2024    CREATININE 0.79 06/21/2024    CREATININE 0.86 03/14/2024    BUN 21 09/27/2024    K 4.4 09/27/2024     09/27/2024    CO2 19 (L) 09/27/2024     eGFR   Date Value Ref Range Status   09/27/2024 72 >59 mL/min/1.73  Final             Lab Results   Component Value Date    ALT 18 09/27/2024    AST 20 09/27/2024       Lab Results   Component Value Date    TSH 0.503 09/27/2024    FREET4 1.99 (H) 09/27/2024       Future Appointments   Date Time Provider Department Center   2/13/2025  9:20 AM Sydnie Hunter MD [facility]

## 2024-11-04 ENCOUNTER — TELEPHONE (OUTPATIENT)
Age: 69
End: 2024-11-04

## 2024-11-04 DIAGNOSIS — E11.9 TYPE 2 DIABETES MELLITUS WITHOUT COMPLICATION, WITHOUT LONG-TERM CURRENT USE OF INSULIN (HCC): Primary | ICD-10-CM

## 2024-11-04 DIAGNOSIS — E11.9 TYPE 2 DIABETES MELLITUS WITHOUT COMPLICATION, WITHOUT LONG-TERM CURRENT USE OF INSULIN (HCC): ICD-10-CM

## 2024-11-04 RX ORDER — LANCETS 33 GAUGE
EACH MISCELLANEOUS
Qty: 100 EACH | Refills: 3 | Status: SHIPPED | OUTPATIENT
Start: 2024-11-04

## 2024-11-04 RX ORDER — BLOOD SUGAR DIAGNOSTIC
STRIP MISCELLANEOUS
Qty: 100 STRIP | Refills: 3 | Status: SHIPPED | OUTPATIENT
Start: 2024-11-04

## 2024-11-04 NOTE — TELEPHONE ENCOUNTER
The patient was called to verify prescription request and how many day supply was needed and how often she checked her sugars as well.  New scripts was sent to the provider

## 2024-11-04 NOTE — TELEPHONE ENCOUNTER
Reason for call:   [x] Refill   [] Prior Auth  [] Other:     Office:   [] PCP/Provider -   [x] Specialty/Provider - Endo    Medication:     metFORMIN (GLUCOPHAGE) 1000 MG tablet       Dose/Frequency:     1,000 mg, Oral, Daily with dinner       Quantity: 90    Pharmacy:  [facility]/pharmacy #[id number] - [place] - [address]     Does the patient have enough for 3 days?   [x] Yes   [] No - Send as HP to POD

## 2024-12-11 DIAGNOSIS — E11.9 TYPE 2 DIABETES MELLITUS WITHOUT COMPLICATION, WITHOUT LONG-TERM CURRENT USE OF INSULIN (HCC): ICD-10-CM

## 2024-12-11 RX ORDER — GLIPIZIDE 10 MG/1
10 TABLET, FILM COATED, EXTENDED RELEASE ORAL DAILY
Qty: 90 TABLET | Refills: 0 | Status: SHIPPED | OUTPATIENT
Start: 2024-12-11

## 2024-12-11 NOTE — TELEPHONE ENCOUNTER
Reason for call:   [x] Refill   [] Prior Auth  [] Other:     Office:   [] PCP/Provider -   [x] Specialty/Provider - DIABETES & ENDOCRINOLOGY [facility]     Medication: glipiZIDE (GLUCOTROL XL) 10 mg 24 hr tablet     Dose/Frequency: 10 mg, Daily     Quantity: 90    Pharmacy: [facility] #[id number]    Does the patient have enough for 3 days?   [x] Yes   [] No - Send as HP to POD

## 2025-03-05 DIAGNOSIS — E11.9 TYPE 2 DIABETES MELLITUS WITHOUT COMPLICATION, WITHOUT LONG-TERM CURRENT USE OF INSULIN (HCC): ICD-10-CM

## 2025-03-05 RX ORDER — GLIPIZIDE 10 MG/1
10 TABLET, FILM COATED, EXTENDED RELEASE ORAL DAILY
Qty: 90 TABLET | Refills: 1 | Status: SHIPPED | OUTPATIENT
Start: 2025-03-05

## 2025-03-08 DIAGNOSIS — E89.0 POSTOPERATIVE HYPOTHYROIDISM: ICD-10-CM

## 2025-03-08 DIAGNOSIS — E11.9 TYPE 2 DIABETES MELLITUS WITHOUT COMPLICATION, WITHOUT LONG-TERM CURRENT USE OF INSULIN (HCC): Primary | ICD-10-CM

## 2025-03-08 NOTE — TELEPHONE ENCOUNTER
Medication Refill Request       Medication: Jardiance 25 mg     Dose/Frequency: 25 mg daily     Quantity: 90    Pharmacy: [facility]    Office:   [] PCP/Provider -   [x] Specialty/Provider - Endocrinology    Does the patient have enough for 3 days?   [x] Yes   [] No - Send as HP to POD    Is the patient completely out of the medication or does not have enough until the next business day?  [] Yes - send to Call Hub  [x] No - Send as HP to POD    Medication Refill Request       Medication: Levothyroxine     Dose/Frequency: 75 mcg daily     Quantity: 90    Pharmacy: [facility]    Office:   [] PCP/Provider -   [x] Specialty/Provider -     Does the patient have enough for 3 days?   [x] Yes   [] No - Send as HP to POD    Is the patient completely out of the medication or does not have enough until the next business day?  [] Yes - send to Call Hub  [x] No - Send as HP to POD

## 2025-03-10 RX ORDER — LEVOTHYROXINE SODIUM 75 UG/1
75 TABLET ORAL DAILY
Qty: 90 TABLET | Refills: 0 | Status: SHIPPED | OUTPATIENT
Start: 2025-03-10

## 2025-03-14 LAB
ALBUMIN SERPL-MCNC: 4.7 G/DL (ref 3.9–4.9)
ALP SERPL-CCNC: 53 IU/L (ref 44–121)
ALT SERPL-CCNC: 23 IU/L (ref 0–32)
AST SERPL-CCNC: 20 IU/L (ref 0–40)
BILIRUB SERPL-MCNC: 0.5 MG/DL (ref 0–1.2)
BUN SERPL-MCNC: 20 MG/DL (ref 8–27)
BUN/CREAT SERPL: 24 (ref 12–28)
CALCIUM SERPL-MCNC: 10.5 MG/DL (ref 8.7–10.3)
CHLORIDE SERPL-SCNC: 101 MMOL/L (ref 96–106)
CHOLEST SERPL-MCNC: 161 MG/DL (ref 100–199)
CO2 SERPL-SCNC: 23 MMOL/L (ref 20–29)
CREAT SERPL-MCNC: 0.85 MG/DL (ref 0.57–1)
EGFR: 75 ML/MIN/1.73
EST. AVERAGE GLUCOSE BLD GHB EST-MCNC: 200 MG/DL
GLOBULIN SER-MCNC: 2.6 G/DL (ref 1.5–4.5)
GLUCOSE SERPL-MCNC: 160 MG/DL (ref 70–99)
HBA1C MFR BLD: 8.6 % (ref 4.8–5.6)
HDLC SERPL-MCNC: 46 MG/DL
LDLC SERPL CALC-MCNC: 73 MG/DL (ref 0–99)
LDLC/HDLC SERPL: 1.6 RATIO (ref 0–3.2)
POTASSIUM SERPL-SCNC: 4.3 MMOL/L (ref 3.5–5.2)
PROT SERPL-MCNC: 7.3 G/DL (ref 6–8.5)
SL AMB VLDL CHOLESTEROL CALC: 42 MG/DL (ref 5–40)
SODIUM SERPL-SCNC: 141 MMOL/L (ref 134–144)
T4 FREE SERPL-MCNC: 1.53 NG/DL (ref 0.82–1.77)
TRIGL SERPL-MCNC: 262 MG/DL (ref 0–149)
TSH SERPL DL<=0.005 MIU/L-ACNC: 1.43 UIU/ML (ref 0.45–4.5)

## 2025-03-16 ENCOUNTER — RESULTS FOLLOW-UP (OUTPATIENT)
Dept: ENDOCRINOLOGY | Facility: HOSPITAL | Age: 70
End: 2025-03-16

## 2025-03-24 ENCOUNTER — OFFICE VISIT (OUTPATIENT)
Dept: ENDOCRINOLOGY | Facility: HOSPITAL | Age: 70
End: 2025-03-24
Payer: MEDICARE

## 2025-03-24 VITALS
WEIGHT: 139.6 LBS | DIASTOLIC BLOOD PRESSURE: 82 MMHG | BODY MASS INDEX: 27.41 KG/M2 | HEART RATE: 67 BPM | HEIGHT: 60 IN | SYSTOLIC BLOOD PRESSURE: 124 MMHG

## 2025-03-24 DIAGNOSIS — E89.0 POSTOPERATIVE HYPOTHYROIDISM: ICD-10-CM

## 2025-03-24 DIAGNOSIS — I10 ESSENTIAL HYPERTENSION: ICD-10-CM

## 2025-03-24 DIAGNOSIS — Z86.39 HISTORY OF PRIMARY HYPERPARATHYROIDISM: ICD-10-CM

## 2025-03-24 DIAGNOSIS — E11.9 TYPE 2 DIABETES MELLITUS WITHOUT COMPLICATION, WITHOUT LONG-TERM CURRENT USE OF INSULIN (HCC): Primary | ICD-10-CM

## 2025-03-24 DIAGNOSIS — E78.2 MIXED HYPERLIPIDEMIA: ICD-10-CM

## 2025-03-24 PROCEDURE — G2211 COMPLEX E/M VISIT ADD ON: HCPCS | Performed by: INTERNAL MEDICINE

## 2025-03-24 PROCEDURE — 99214 OFFICE O/P EST MOD 30 MIN: CPT | Performed by: INTERNAL MEDICINE

## 2025-03-24 RX ORDER — GLIPIZIDE 5 MG/1
TABLET, FILM COATED, EXTENDED RELEASE ORAL
Qty: 180 TABLET | Refills: 3 | Status: SHIPPED | OUTPATIENT
Start: 2025-03-24

## 2025-03-24 NOTE — PATIENT INSTRUCTIONS
Hgba1c is 8.6%. this is higher.     Increase the glipizide to add 5 mg at dinner. Continue the same 15 mg in am, jardiance, and metformin.     Continue to work on diet and regular exercise.     Continue to test the blood sugars once daily.     The thyroid is normal. Continue the same levothyroxine.     The calcium is a false high since the albumin is also high, we correct the calcium based on the albumin.     Follow up in 4 months with blood work.

## 2025-03-24 NOTE — PROGRESS NOTES
3/24/2025    Assessment & Plan      Diagnoses and all orders for this visit:    Type 2 diabetes mellitus without complication, without long-term current use of insulin (HCC)  -     glipiZIDE (GLUCOTROL XL) 5 mg 24 hr tablet; Take 1 daily in am with glipizide XL 10 mg tablet and 1 tablet in pm  -     Comprehensive metabolic panel; Future  -     Hemoglobin A1C; Future  -     TSH, 3rd generation; Future  -     T4, free; Future  -     CBC and differential; Future  -     Albumin / creatinine urine ratio; Future  -     Calcium, ionized; Future    Postoperative hypothyroidism  -     Comprehensive metabolic panel; Future  -     Hemoglobin A1C; Future  -     TSH, 3rd generation; Future  -     T4, free; Future  -     CBC and differential; Future  -     Albumin / creatinine urine ratio; Future  -     Calcium, ionized; Future    Essential hypertension  -     Comprehensive metabolic panel; Future  -     Hemoglobin A1C; Future  -     TSH, 3rd generation; Future  -     T4, free; Future  -     CBC and differential; Future  -     Albumin / creatinine urine ratio; Future  -     Calcium, ionized; Future    Mixed hyperlipidemia  -     Comprehensive metabolic panel; Future  -     Hemoglobin A1C; Future  -     TSH, 3rd generation; Future  -     T4, free; Future  -     CBC and differential; Future  -     Albumin / creatinine urine ratio; Future  -     Calcium, ionized; Future    History of primary hyperparathyroidism  -     Comprehensive metabolic panel; Future  -     Hemoglobin A1C; Future  -     TSH, 3rd generation; Future  -     T4, free; Future  -     CBC and differential; Future  -     Albumin / creatinine urine ratio; Future  -     Calcium, ionized; Future          Assessment & Plan  1. Type 2 Diabetes Mellitus.  Her hemoglobin A1c levels have escalated to 8.6, indicating a need for intervention. The current regimen includes Jardiance 25 mg daily, glipizide 15 mg daily (10 mg in the morning and 5 mg at night), and metformin 1000 mg  at dinner. An additional dose of glipizide 5 mg will be introduced at dinner time to manage elevated morning glucose levels. She is advised to persist with dietary modifications and regular physical activity. Blood work has been ordered for future monitoring.    2. Hypothyroidism post thyroidectomy.  She is currently on levothyroxine 75 mcg daily. No new symptoms of hypo- or hyperthyroidism were reported. Continue current medication regimen.    3. Hyperlipidemia.  Her triglyceride levels are elevated, likely due to uncontrolled blood glucose levels. She is currently on atorvastatin 40 mg daily and fenofibrate 145 mg daily. Continue current medication regimen and monitor lipid levels.    4. Hypertension.  She is currently on metoprolol 50 mg twice daily, spironolactone 50 mg daily, and verapamil 180 mg daily. No new symptoms of hypertension were reported. Continue current medication regimen.    5. Hyperparathyroidism.  Her calcium levels appear elevated but are actually within normal range when corrected for high albumin levels. No further action required at this time.    I have asked her to follow-up in 4 months with preceding hemoglobin A1c, CMP, ionized calcium, CBC, TSH, free T4, and urine microalbumin to creatinine ratio.        CC: Diabetes type II, thyroid, blood pressure, lipid, parathyroid follow-up    History of Present Illness    HPI: Sarkis De Los Santos is a 70-year-old female with a history of type 2 diabetes diagnosed in 2018, hypothyroidism post subtotal thyroidectomy, hyperparathyroidism post parathyroid adenoma removal, hypertension, and hyperlipidemia, here for a follow-up visit.     Diabetes complications are none as she reports no neuropathy, nephropathy, retinopathy, heart attack, stroke, or claudication.    She is currently on Jardiance 25 mg daily, glipizide 15 mg daily (10 mg in the morning and 5 mg at night), and metformin 1000 mg with dinner.  She reports occasional polyuria, including nocturia with  2 episodes last night. She experiences frequent thirst and increased appetite during periods of stress or emotional upset.     She does not experience any visual disturbances. Her last ophthalmology appointment was in December 2024, where she was informed that her diabetes was impacting her blood vessels, but this condition could potentially improve. She is scheduled for a follow-up in 6 months.     She reports no peripheral neuropathy or foot ulcers.  Last diabetic foot exam was December 2023.  She maintains an active lifestyle, including regular walking and using a step tracker, with a recent record of 11,000 steps in a day. She adheres to a diet of small portions but occasionally overeats when hungry.    She has a history of rheumatoid arthritis, which has significantly affected her hands. She recently consulted a new physician who recommended occupational therapy. She experiences difficulty in handling small objects, such as medication or coins, and has had incidents of dropping items without realizing it. She is currently on the lowest dose of Remicade, which is planned to be increased. She has not been prescribed any steroids recently but has taken prednisone in the past.    She is also taking levothyroxine 75 mcg daily for her postoperative hypothyroidism. She reports feeling cold during winter and hot in summer. She does not experience any palpitations, tremors, or shaky hands. She occasionally experiences diarrhea and sometimes falls asleep while watching TV, which disrupts her nighttime sleep. She has dry skin and occasional nail breakage but does not report significant hair loss.    She is also taking atorvastatin 40 mg daily and fenofibrate 145 mg daily for cholesterol management.    She is also taking metoprolol 50 mg twice daily, spironolactone 50 mg daily, and verapamil 180 mg daily for blood pressure control. She does not experience any chest pain or shortness of breath.    Blood  Sugar/Glucometer/Pump/CGM review: She monitors her blood glucose levels daily upon waking, which was 140 this morning after a 12-hour fast. A few weeks ago, her morning readings were in the 120s. She has not experienced any hypoglycemic episodes.       Historical Information   Past Medical History:   Diagnosis Date    Diabetes mellitus (HCC)     Disease of thyroid gland     hypo    Hyperlipidemia     Hyperparathyroidism (HCC)     Hypertension     Osteoporosis     Rheumatoid arthritis (HCC)      Past Surgical History:   Procedure Laterality Date    PARATHYROID GLAND SURGERY  2011    left lower parathyroid removed    THYROIDECTOMY  2011    subtotal thyroidectomy     Social History   Social History     Substance and Sexual Activity   Alcohol Use Never     Social History     Substance and Sexual Activity   Drug Use Never     Social History     Tobacco Use   Smoking Status Never   Smokeless Tobacco Never     Family History:   Family History   Problem Relation Age of Onset    Thyroid disease unspecified Mother     Hypertension Father     Diabetes type II Sister     Colon polyps Neg Hx     Colon cancer Neg Hx        Meds/Allergies   Current Outpatient Medications   Medication Sig Dispense Refill    alendronate (FOSAMAX) 70 mg tablet Take 70 mg by mouth every 7 days      atorvastatin (LIPITOR) 40 mg tablet Take 40 mg by mouth daily  4    Empagliflozin 25 MG TABS Take 1 tablet (25 mg total) by mouth in the morning 25 mg daily 90 tablet 0    fenofibrate (TRICOR) 145 mg tablet Take 145 mg by mouth daily  5    folic acid (FOLVITE) 1 mg tablet Take 1,000 mcg by mouth daily      glipiZIDE (GLUCOTROL XL) 10 mg 24 hr tablet TAKE 1 TABLET BY MOUTH EVERY DAY 90 tablet 1    glipiZIDE (GLUCOTROL XL) 5 mg 24 hr tablet Take 1 daily in am with glipizide XL 10 mg tablet and 1 tablet in pm 180 tablet 3    Glucosamine-Chondroitin (Osteo Bi-Flex Regular Strength) 250-200 MG TABS Take by mouth      glucose blood (OneTouch Verio) test strip Use  to test blood sugars once a day 100 strip 3    inFLIXimab (Remicade) 100 mg Inject into a catheter in a vein      Lactobacillus (Probiotic Acidophilus) CAPS Take by mouth      levothyroxine 75 mcg tablet Take 1 tablet (75 mcg total) by mouth daily 90 tablet 0    Magnesium 100 MG CAPS Take by mouth      metFORMIN (GLUCOPHAGE) 1000 MG tablet Take 1 tablet (1,000 mg total) by mouth daily with dinner 1000mg daily 90 tablet 1    methotrexate 2.5 MG tablet 10 tablets once a week      metoprolol tartrate (LOPRESSOR) 50 mg tablet Take 50 mg by mouth 2 (two) times a day  3    Multiple Vitamins-Minerals (Multi For Her) CAPS Take by mouth      Omega-3 Fatty Acids (fish oil) 1,000 mg Take 1,000 mg by mouth daily      OneTouch Delica Lancets 33G MISC Use to test blood sugars once a day 100 each 3    spironolactone (ALDACTONE) 50 mg tablet Take 50 mg by mouth daily  3    verapamil (CALAN-SR) 180 mg CR tablet Take 180 mg by mouth daily  3    Vitamin D, Cholecalciferol, 25 MCG (1000 UT) CAPS Take by mouth       No current facility-administered medications for this visit.     Allergies   Allergen Reactions    Atenolol Swelling    Hydrochlorothiazide Other (See Comments)     Pt thinks she got Gout.     Lisinopril Other (See Comments)     Pt does not remember the reaction.        Objective   Vitals: Blood pressure 124/82, pulse 67, height 5' (1.524 m), weight 63.3 kg (139 lb 9.6 oz).  Invasive Devices       None                   Physical Exam    No lid lag, stare, proptosis or periorbital edema in the eyes.  Healed anterior neck scar. No palpable thyroid tissue. No masses or lymphadenopathy in the neck.  Lungs are clear to auscultation.  Heart has a regular rate and rhythm. No murmurs.  No tremor of the outstretched hands. Patellar deep tendon reflex is normal.  No lower extremity edema. No ulcerations of the feet. Callus on the medial portion of the first metatarsophalangeal joints bilaterally, left worse than right. Callus on the  plantar surface of the metatarsophalangeal joints at the 2nd and 3rd toe area. Arthritic changes of the first metatarsophalangeal joints bilaterally, left larger than right. Dorsalis pedis and posterior tibialis pulses are 2+. Vibration sensation is diminished to the first toe DIP joint bilaterally. Monofilament sensation is intact to both feet except for the fifth metatarsophalangeal joint on the right.  Patient's shoes and socks removed.    Right Foot/Ankle   Right Foot Inspection  Skin Exam: skin normal, skin intact, callus and callus. No dry skin, no warmth, no erythema, no maceration, no abnormal color, no pre-ulcer and no ulcer.     Toe Exam: right toe deformity. No swelling    Sensory   Vibration: diminished  Monofilament testing: diminished    Vascular  The right DP pulse is 2+. The right PT pulse is 2+.     Left Foot/Ankle  Left Foot Inspection  Skin Exam: skin normal, skin intact and callus. No dry skin, no warmth, no erythema, no maceration, normal color, no pre-ulcer and no ulcer.     Toe Exam: left toe deformity. No swelling.     Sensory   Vibration: diminished  Monofilament testing: intact    Vascular  The left DP pulse is 2+. The left PT pulse is 2+.     Assign Risk Category  Deformity present  Loss of protective sensation  No weak pulses  Risk: 2        The history was obtained from the review of the chart and from the patient.    Lab Results:    Most recent Alc is  Lab Results   Component Value Date    HGBA1C 8.6 (H) 03/14/2025           Blood work performed on 3/14/2025 showed a CMP with a glucose of 160 random, calcium 10.5 with an albumin of 4.7 which corrects the calcium to 10.1 but was otherwise normal.    Lab Results   Component Value Date    CREATININE 0.85 03/14/2025    CREATININE 0.88 09/27/2024    CREATININE 0.79 06/21/2024    BUN 20 03/14/2025    K 4.3 03/14/2025     03/14/2025    CO2 23 03/14/2025     eGFR   Date Value Ref Range Status   03/14/2025 75 >59 mL/min/1.73 Final      Total cholesterol 161, LDL cholesterol 73.    Lab Results   Component Value Date    HDL 46 03/14/2025    TRIG 262 (H) 03/14/2025       Lab Results   Component Value Date    ALT 23 03/14/2025    AST 20 03/14/2025       Lab Results   Component Value Date    TSH 1.430 03/14/2025    FREET4 1.53 03/14/2025             Future Appointments   Date Time Provider Department Center   7/29/2025  1:40 PM Sydnie Hunter MD [facility]

## 2025-05-06 DIAGNOSIS — E11.9 TYPE 2 DIABETES MELLITUS WITHOUT COMPLICATION, WITHOUT LONG-TERM CURRENT USE OF INSULIN (HCC): ICD-10-CM

## 2025-06-04 DIAGNOSIS — E11.9 TYPE 2 DIABETES MELLITUS WITHOUT COMPLICATION, WITHOUT LONG-TERM CURRENT USE OF INSULIN (HCC): ICD-10-CM

## 2025-06-04 RX ORDER — LEVOTHYROXINE SODIUM 75 UG/1
75 TABLET ORAL DAILY
Qty: 90 TABLET | Refills: 0 | Status: SHIPPED | OUTPATIENT
Start: 2025-06-04

## 2025-06-11 DIAGNOSIS — E89.0 POSTOPERATIVE HYPOTHYROIDISM: ICD-10-CM

## 2025-07-17 LAB
LEFT EYE DIABETIC RETINOPATHY: NORMAL
RIGHT EYE DIABETIC RETINOPATHY: NORMAL

## 2025-07-18 ENCOUNTER — VBI (OUTPATIENT)
Dept: ADMINISTRATIVE | Facility: OTHER | Age: 70
End: 2025-07-18

## 2025-07-21 NOTE — PROGRESS NOTES
Daily Note     Today's date: 2025  Patient name: Sarkis De Los Santos  : 1955  MRN: [id number]  Referring provider: No ref. provider found  Encounter Diagnosis   Name Primary?    Pain in both hands Yes                Subjective: Sarkis reports that her wrists and hands were achy after last visit. Pt reports feeling good this morning. No pain reported      Objective: See treatment diary below      Assessment: Tolerated treatment session with decreased reports of stiffness. Left digit 5 with improved arom post heat and exercise. Patient would benefit from continued OT for arom, strengthening, joint protection techniques and increased overall functional use of both hands.       Plan: Progress treatment as tolerated.  Reassess next visit        Precautions: standard      Treatment Diary             Manual Therapy             Soft tissue massage  both hands deferred                                                   Ther Ex             Towel walks/squeezes x 10 Both hands            2# forearm arom 1 sets x 15,1# for 1 set x15 Both sides            1# wrist arom 2 sets x 15 Both sides            2# elbow flex/extend 2 sets x 15 Both sides with palms up position            Tendon glides x10 Both sides            Thumb arom x 10 Both   sides            Digit oposition x 10 Both sides            Digit 1 IP blocking x 10   Both sides            18# gripper squeezes 25x Both hands                                      Ther Activity             Isopheres 2 minutes Both hands            Yellow digi flex 15 bilaterally Both hands            [place] with in hand manipulation- removed with red clothespin Both sides and with green             Reviewed putty exercises x                                                                Modalities             Paraffin both hands with hot pack Completed x 10 min                         History of present illness: Pt reports history of RA and osteoarthritis. Pt reports increased  difficulty using both hands and referred to outpatient OT   Re certification date: 8/4/25

## 2025-07-22 ENCOUNTER — OFFICE VISIT (OUTPATIENT)
Age: 70
End: 2025-07-22
Payer: MEDICARE

## 2025-07-22 DIAGNOSIS — M79.642 PAIN IN BOTH HANDS: Primary | ICD-10-CM

## 2025-07-22 DIAGNOSIS — M79.641 PAIN IN BOTH HANDS: Primary | ICD-10-CM

## 2025-07-22 PROCEDURE — 97110 THERAPEUTIC EXERCISES: CPT

## 2025-07-22 PROCEDURE — 97530 THERAPEUTIC ACTIVITIES: CPT

## 2025-07-22 PROCEDURE — 97018 PARAFFIN BATH THERAPY: CPT

## 2025-07-22 PROCEDURE — 97010 HOT OR COLD PACKS THERAPY: CPT

## 2025-07-24 ENCOUNTER — OFFICE VISIT (OUTPATIENT)
Age: 70
End: 2025-07-24
Payer: MEDICARE

## 2025-07-24 DIAGNOSIS — M79.642 PAIN IN BOTH HANDS: Primary | ICD-10-CM

## 2025-07-24 DIAGNOSIS — M79.641 PAIN IN BOTH HANDS: Primary | ICD-10-CM

## 2025-07-24 PROCEDURE — 97530 THERAPEUTIC ACTIVITIES: CPT

## 2025-07-24 PROCEDURE — 97110 THERAPEUTIC EXERCISES: CPT

## 2025-07-24 PROCEDURE — 97018 PARAFFIN BATH THERAPY: CPT

## 2025-07-24 PROCEDURE — 97010 HOT OR COLD PACKS THERAPY: CPT

## 2025-07-24 NOTE — PROGRESS NOTES
"Daily Note     Today's date: 2025  Patient name: Sarkis De Los Santos  : 1955  MRN: [id number]  Referring provider: No ref. provider found  Dx:   Encounter Diagnosis     ICD-10-CM    1. Pain in both hands  M79.641     M79.642                      Subjective: Pt reports therapy is helping and decreased overall stiffness. \" I still have a hard time opening jars but I do have some tools to help me\" \" I just don't have the strength anymore\"      Objective: See treatment diary below      Assessment: Tolerated treatment well. Patient continues to require cues to complete exercises correctly.  Discussed ongoing joint protection techniques and use of larger handles and aides to assist with opening jars/containers. Discussed purchasing home paraffin unit as pt feels helpful to decrease joint stiffness.  Pt receptive to information and treatment.       Plan: Reassess next week and wean to home program as appropriate     Precautions: standard      Treatment Diary            Manual Therapy             Soft tissue massage  both hands deferred na                                                  Ther Ex             Towel walks/squeezes x 10 Both hands Both hands           2# forearm arom 1 sets x 15,1# for 1 set x15 Both sides 2# both sides today x 30           1# wrist arom 2 sets x 15 Both sides Both sides           2# elbow flex/extend 2 sets x 15 Both sides with palms up position Both sides           [place]end[place] glides x10 Both sides Both            Thumb arom x 10 Both   sides both           Digit oposition x 10 Both sides both           Digit 1 IP blocking x 10   Both sides both           18# gripper squeezes 25x Both hands Both hands           Yellow digi flex 15 bilaterally  Both hands                        Ther Activity             Isopheres 2 minutes Both hands Both hands           [place] with in hand manipulation- removed with red clothespin Both sides and with green  Both sides  Increased to   Green to "   remove           Reviewed putty exercises x ongoing                                                               Modalities             Paraffin both hands with hot pack Completed x 10 min 10 min both hands                        History of present illness: Pt reports history of RA and osteoarthritis. Pt reports increased difficulty using both hands and referred to outpatient OT

## 2025-07-31 ENCOUNTER — OFFICE VISIT (OUTPATIENT)
Age: 70
End: 2025-07-31
Payer: MEDICARE

## 2025-07-31 DIAGNOSIS — M79.641 PAIN IN BOTH HANDS: Primary | ICD-10-CM

## 2025-07-31 DIAGNOSIS — M79.642 PAIN IN BOTH HANDS: Primary | ICD-10-CM

## 2025-07-31 PROCEDURE — 97530 THERAPEUTIC ACTIVITIES: CPT

## 2025-07-31 PROCEDURE — 97110 THERAPEUTIC EXERCISES: CPT
